# Patient Record
Sex: FEMALE | Race: WHITE | NOT HISPANIC OR LATINO | Employment: FULL TIME | URBAN - METROPOLITAN AREA
[De-identification: names, ages, dates, MRNs, and addresses within clinical notes are randomized per-mention and may not be internally consistent; named-entity substitution may affect disease eponyms.]

---

## 2017-01-24 DIAGNOSIS — M81.0 OSTEOPOROSIS: Primary | ICD-10-CM

## 2017-01-26 RX ORDER — ALENDRONATE SODIUM 70 MG/1
TABLET ORAL
Qty: 4 TABLET | Refills: 0 | Status: SHIPPED | OUTPATIENT
Start: 2017-01-26 | End: 2017-04-06

## 2017-01-26 NOTE — TELEPHONE ENCOUNTER
aldendronate 70mg      Last Written Prescription Date:  3/31/16  Last Fill Quantity: 12,   # refills: 3  Last Office Visit with [facility] primary care provider:  3/31/16  Future Office visit: none    Pt due for annual, no appt scheduled. One month extension sent.

## 2017-01-30 ENCOUNTER — OFFICE VISIT (OUTPATIENT)
Dept: FAMILY MEDICINE | Facility: CLINIC | Age: 62
End: 2017-01-30
Payer: COMMERCIAL

## 2017-01-30 DIAGNOSIS — L81.4 SOLAR LENTIGO: ICD-10-CM

## 2017-01-30 DIAGNOSIS — D22.5 MELANOCYTIC NEVI OF TRUNK: ICD-10-CM

## 2017-01-30 DIAGNOSIS — L57.0 AK (ACTINIC KERATOSIS): Primary | ICD-10-CM

## 2017-01-30 PROCEDURE — 99213 OFFICE O/P EST LOW 20 MIN: CPT | Mod: 25 | Performed by: FAMILY MEDICINE

## 2017-01-30 NOTE — MR AVS SNAPSHOT
After Visit Summary   1/30/2017    Jeniffer Meyer    MRN: [id number]           Patient Information     Date Of Birth          1955        Visit Information        Provider Department      1/30/2017 12:20 PM Jinny Ibrahim MD [facility] - Primary Care Skin        Today's Diagnoses     AK (actinic keratosis)    -  1     Solar lentigo           Care Instructions    Yearly skin exam    CRYOTHERAPY FOR ACTINIC KERATOSES POST-TREATMENT CARE INSTRUCTIONS  Actinic keratoses are benign, scaly or gritty lesions that appear in sun-exposed areas and may progress to skin cancers. For this reason, it is important to treat them before they become cancerous.  Liquid nitrogen is mildly uncomfortable when applied to the skin, but the discomfort rapidly subsides.    Post-Treatment:  You may experience burning and/or stinging immediately following the procedure. The discomfort from the procedure may persist over the next 12-24 hours. The area treated will look pinker and slightly swollen before the healing process begins. You may also notice redness, swelling, tenderness, weeping and crusts or scabs.    Blister - You may or may notice blistering from the freezing. If you develop an uncomfortable blister from today's treatment, you may gently puncture this with a needle that has been cleaned with alcohol. However, do not remove the protective skin layer of the blister.    Scab - After a few days, you may notice scaliness or scab formation. Do not pick at the scabs because this may cause slower healing and a permanent scar.    The skin may appear temporarily darker at the treatment site, but this usually fades over a period of months, provided that the area is protected from the sun.    Care of the areas treated:    Wash the area with a mild cleanser.    Gently pat dry.    Do not rub.     Keep protected from the sun during the healing process and for a full year following treatment as the skin continues  "to remodel during this time.    Apply Vaseline or Aquaphor ointment sparingly to the site for the first 7 days after treatment.    Do not use Neosporin, as many people eventually develop a medication allergy, that can easily be confused with an infection, to Neomycin.    Return if:  There should not be any residual scaling. If there is any concern that the lesion has persisted after 4-6 weeks, make an appointment for a re-check. Healing time does vary depending on your individual healing process and the area of the body treated. Most patients will be healed in one month.    Signs of Infection:  Thankfully this is rare. However if you notice persistent colored drainage, increasing pain, fever or other signs of infection, please call us at: [phone]          Follow-ups after your visit        Who to contact     If you have questions or need follow up information about today's clinic visit or your schedule please contact [facility] - PRIMARY CARE SKIN directly at [phone].  Normal or non-critical lab and imaging results will be communicated to you by [employer], letter or phone within 4 business days after the clinic has received the results. If you do not hear from us within 7 days, please contact the clinic through [employer] or phone. If you have a critical or abnormal lab result, we will notify you by phone as soon as possible.  Submit refill requests through [employer] or call your pharmacy and they will forward the refill request to us. Please allow 3 business days for your refill to be completed.          Additional Information About Your Visit        [employer] Information     [employer] lets you send messages to your doctor, view your test results, renew your prescriptions, schedule appointments and more. To sign up, go to [email or web address] . Click on \"Log in\" on the left side of the screen, which will take you to the Welcome page. Then click on \"Sign up Now\" on the right side of the page.     You will " be asked to enter the access code listed below, as well as some personal information. Please follow the directions to create your username and password.     Your access code is: [email or web address]  Expires: 2017 12:51 PM     Your access code will  in 90 days. If you need help or a new code, please call your [facility] clinic or [phone].        Care EveryWhere ID     This is your Care EveryWhere ID. This could be used by other organizations to access your [facility] medical records  [id number]         Blood Pressure from Last 3 Encounters:   16 110/72   03/27/15 128/68    Weight from Last 3 Encounters:   16 135 lb (61.236 kg)   03/27/15 137 lb (62.143 kg)              We Performed the Following     DESTRUCT PREMALIGNANT LESION, FIRST        Primary Care Provider Office Phone # Fax #    Jessica Jaffe [phone]       ALICE WINKLER [address] [facility] [address] [facility] [address]  [facility] [address]        Thank you!     Thank you for choosing [facility] - PRIMARY CARE SKIN  for your care. Our goal is always to provide you with excellent care. Hearing back from our patients is one way we can continue to improve our services. Please take a few minutes to complete the written survey that you may receive in the mail after your visit with us. Thank you!             Your Updated Medication List - Protect others around you: Learn how to safely use, store and throw away your medicines at www.disposemymeds.org.          This list is accurate as of: 17 12:51 PM.  Always use your most recent med list.                   Brand Name Dispense Instructions for use    * alendronate 70 MG/75ML solution    FOSAMAX     Take 70 mg by mouth every 7 days       * alendronate 70 MG tablet    FOSAMAX    4 tablet    Take 1 tablet by mouth  every 7 days at least 1  hour before breakfast with  8 ounces of water and stay  upright for at least 30min       ciclopirox 8 % Soln    PENLAC    1 Bottle    Apply topically to  affected nails q day       desonide 0.05 % cream    DESOWEN    15 g    Apply sparingly to affected area two times per day for no more then 3 -5 consecutive days       MAGNESIUM OXIDE PO          MULTIVITAMIN PO          PROBIOTIC PO          triamterene-hydrochlorothiazide 18.75-12.5 MG per half-tab    MAXZIDE-25     Take  by mouth daily.       VITAMIN C PO          * Notice:  This list has 2 medication(s) that are the same as other medications prescribed for you. Read the directions carefully, and ask your doctor or other care provider to review them with you.

## 2017-01-30 NOTE — PROGRESS NOTES
[facility] - PRIMARY CARE SKIN    CC : skin exam (full-body)  SUBJECTIVE:                                                    Jeniffer Meyer is a 60 year old female who presents to clinic today for a full-body skin exam because of family history of non-melanoma skin cancer. A lesion was noted on the back of the neck when she had a massage.      Uses sunscreen : Yes: and sun-protective clothing, frequency : daily.  Previous history of sun exposure : Yes: with a history of blistering sunburns when younger and previous use of tanning bed. Just uses self jeri.    Personal history of skin cancer : No, eczema : Yes and psoriasis : No.  Family history of skin cancer : Yes: father and brother - both non-melanoma, eczema : No and psoriasis : No.    Occupation : [employer]     Patient Active Problem List   Diagnosis     Osteoporosis     Hypertension, essential       Past Medical History   Diagnosis Date     Anxiety      Arthritis      Hypertension, essential      followed by PCP (Dr. Jaffe)     Migraine      Osteoporosis 1/2014     hip T-score -2.8 (left) and -2.7 (right), on Fosomax    Past Surgical History   Procedure Laterality Date     Mammoplasty augmentation Bilateral      saline implants     Endometrial sampling (biopsy)  4/2006     PMB-->path: benign     Foot surgery       Hc ablation, endometrial, thermal, w/o hysteroscopic guidance  6/2004     (Sondra)     Hand surgery       thumb      Social History   Substance Use Topics     Smoking status: Never Smoker      Smokeless tobacco: Never Used     Alcohol Use: 0.0 oz/week     0 Standard drinks or equivalent per week    Family History     Problem (# of Occurrences) Relation (Name,Age of Onset)    Breast Cancer (2) Sister (52), Paternal Grandmother    Colon Cancer (1) Father    DIABETES (1) Mother    Fractures (1) Mother: Hip    HEART DISEASE (1) Brother    Hyperlipidemia (2) Mother, Father    Hypertension (2) Mother, Father    OSTEOPOROSIS (3) Mother,  Maternal Grandmother, Maternal Grandfather           Prescription Medications as of 1/30/2017             alendronate (FOSAMAX) 70 MG tablet Take 1 tablet by mouth  every 7 days at least 1  hour before breakfast with  8 ounces of water and stay  upright for at least 30min    desonide (DESOWEN) 0.05 % cream Apply sparingly to affected area two times per day for no more then 3 -5 consecutive days    MAGNESIUM OXIDE PO     Multiple Vitamins-Minerals (MULTIVITAMIN PO)     Ascorbic Acid (VITAMIN C PO)     alendronate (FOSAMAX) 70 MG/75ML solution Take 70 mg by mouth every 7 days    ciclopirox (PENLAC) 8 % SOLN Apply topically to affected nails q day    triamterene-hydrochlorothiazide (MAXZIDE-25) 18.75-12.5 MG Take  by mouth daily.    Probiotic Product (PROBIOTIC PO)           No Known Allergies     INTEGUMENTARY/SKIN: NEGATIVE for worrisome rashes, moles or lesions  ROS : 14 point review of systems was negative except the symptoms listed above in the HPI.    This document serves as a record of the services and decisions personally performed and made by Karen Ibrahim MD. It was created on her behalf by Rd Jones, a trained medical scribe.  The creation of this document is based on the scribe's personal observations and the the provider's statements to the medical scribe.  Rd Jones, December 18, 2015 12:02 PM      OBJECTIVE:                                                    GENERAL: healthy, alert and no distress  SKIN: [place] Skin Type - II.  This patient was examined from the top of the head to the bottom of the feet  including scalp, face, neck, back, chest, breasts, buttocks, both arms, both legs, both hands, both feet, all 10 fingers in all 10 toes. The dermatoscope was used to help evaluate pigmented lesions.  Skin Pertinent Findings:  Arms : Scattered, brown, macule(s) most consistent with benign solar lentigo. Infrequent, brown macules of various sizes and shapes most consistent with benign nevi  (melanocytic nevi).     Chest, abdomen and back : multiple brown, macule(s) most consistent with benign solar lentigo. Infrequent, brown macules most consistent with benign nevi (melanocytic nevi).    Legs : Multiple brown, macule(s) most consistent with benign solar lentigo. Scattered 2 mm in size, brown macules most consistent with benign nevi (melanocytic nevi).     Diagnostic Test Results:  none           ASSESSMENT:                                                      Encounter Diagnoses   Name Primary?     AK (actinic keratosis) Yes     Solar lentigo            PLAN:                                                    Patient Instructions   SUNSCREEN INSTRUCTIONS    FUTURE APPOINTMENTS  Follow up in 1 year(s) for a full-body skin exam.    The patient was counseled about sunscreens and sun avoidance. The patient was counseled to check the skin regularly and report any lesion that is new, changing, itching, scabbing, bleeding or otherwise bothersome. The patient was discharged ambulatory and in stable condition.    Recommendations : annual skin exams.      PROCEDURES:                                                    None.    TT : 25 minutes.  CT : 15 minutes.      The information in this document, created by the medical scribe for me, accurately reflects the services I personally performed and the decisions made by me. I have reviewed and approved this document for accuracy prior to leaving the patient care area.  Karen Ibrahim MD December 18, 2015 12:02 PM  [facility] - PRIMARY CARE SKIN

## 2017-01-30 NOTE — PATIENT INSTRUCTIONS
Yearly skin exam    CRYOTHERAPY FOR ACTINIC KERATOSES POST-TREATMENT CARE INSTRUCTIONS  Actinic keratoses are benign, scaly or gritty lesions that appear in sun-exposed areas and may progress to skin cancers. For this reason, it is important to treat them before they become cancerous.  Liquid nitrogen is mildly uncomfortable when applied to the skin, but the discomfort rapidly subsides.    Post-Treatment:  You may experience burning and/or stinging immediately following the procedure. The discomfort from the procedure may persist over the next 12-24 hours. The area treated will look pinker and slightly swollen before the healing process begins. You may also notice redness, swelling, tenderness, weeping and crusts or scabs.    Blister - You may or may notice blistering from the freezing. If you develop an uncomfortable blister from today's treatment, you may gently puncture this with a needle that has been cleaned with alcohol. However, do not remove the protective skin layer of the blister.    Scab - After a few days, you may notice scaliness or scab formation. Do not pick at the scabs because this may cause slower healing and a permanent scar.    The skin may appear temporarily darker at the treatment site, but this usually fades over a period of months, provided that the area is protected from the sun.    Care of the areas treated:    Wash the area with a mild cleanser.    Gently pat dry.    Do not rub.     Keep protected from the sun during the healing process and for a full year following treatment as the skin continues to remodel during this time.    Apply Vaseline or Aquaphor ointment sparingly to the site for the first 7 days after treatment.    Do not use Neosporin, as many people eventually develop a medication allergy, that can easily be confused with an infection, to Neomycin.    Return if:  There should not be any residual scaling. If there is any concern that the lesion has persisted after 4-6 weeks,  make an appointment for a re-check. Healing time does vary depending on your individual healing process and the area of the body treated. Most patients will be healed in one month.    Signs of Infection:  Thankfully this is rare. However if you notice persistent colored drainage, increasing pain, fever or other signs of infection, please call us at: [phone]

## 2017-02-20 ENCOUNTER — OFFICE VISIT (OUTPATIENT)
Dept: FAMILY MEDICINE | Facility: CLINIC | Age: 62
End: 2017-02-20
Payer: COMMERCIAL

## 2017-02-20 DIAGNOSIS — L57.0 AK (ACTINIC KERATOSIS): Primary | ICD-10-CM

## 2017-02-20 DIAGNOSIS — Z87.2 HISTORY OF ACTINIC KERATOSES: ICD-10-CM

## 2017-02-20 DIAGNOSIS — Z80.8 FAMILY HISTORY OF NONMELANOMA SKIN CANCER: ICD-10-CM

## 2017-02-20 DIAGNOSIS — B35.1 DERMATOPHYTOSIS OF NAIL: ICD-10-CM

## 2017-02-20 PROCEDURE — 17000 DESTRUCT PREMALG LESION: CPT | Performed by: FAMILY MEDICINE

## 2017-02-20 PROCEDURE — 17003 DESTRUCT PREMALG LES 2-14: CPT | Performed by: FAMILY MEDICINE

## 2017-02-20 PROCEDURE — 99212 OFFICE O/P EST SF 10 MIN: CPT | Mod: 25 | Performed by: FAMILY MEDICINE

## 2017-02-20 RX ORDER — CICLOPIROX 80 MG/ML
SOLUTION TOPICAL
Qty: 1 BOTTLE | Refills: 3 | Status: CANCELLED | OUTPATIENT
Start: 2017-02-20

## 2017-02-20 NOTE — PROGRESS NOTES
[facility] - PRIMARY CARE SKIN    CC : actinic keratosis  SUBJECTIVE:                                                    Jeniffer Meyer is a 61 year old female who presents to clinic today for cryotherapy treatment of an actinic keratoses the lip after last full-body skin cancer screening on 1/30/2017.    Issue Two : She also requests refill of Penlac for dermatophytosis of nails. This is worse in the summer, aggravated by use of nail polish (summer use only). Recurrence on three particular toes.    Uses sunscreen : Yes: and sun-protective clothing, frequency : daily.  Previous history of sun exposure : Yes: with a history of blistering sunburns when younger and previous use of tanning bed. Just uses self jeri.    Personal history of skin cancer : No, eczema : Yes and psoriasis : No.  Family history of skin cancer : Yes: father and brother - both non-melanoma, eczema : No and psoriasis : No.    Occupation : [employer]     Patient Active Problem List   Diagnosis     Osteoporosis     Hypertension, essential     Family history of nonmelanoma skin cancer     History of actinic keratoses       Past Medical History   Diagnosis Date     Anxiety      Arthritis      Hypertension, essential      followed by PCP (Dr. Jaffe)     Migraine      Osteoporosis 1/2014     hip T-score -2.8 (left) and -2.7 (right), on Fosomax    Past Surgical History   Procedure Laterality Date     Mammoplasty augmentation Bilateral      saline implants     Endometrial sampling (biopsy)  4/2006     PMB-->path: benign     Foot surgery       Hc ablation, endometrial, thermal, w/o hysteroscopic guidance  6/2004     (Sondra)     Hand surgery       thumb      Social History   Substance Use Topics     Smoking status: Never Smoker     Smokeless tobacco: Never Used     Alcohol use 0.0 oz/week     0 Standard drinks or equivalent per week    Family History     Problem (# of Occurrences) Relation (Name,Age of Onset)    Breast Cancer (2) Sister (52),  Paternal Grandmother    Colon Cancer (1) Father    DIABETES (1) Mother    Fractures (1) Mother: Hip    HEART DISEASE (1) Brother    Hyperlipidemia (2) Father, Mother    Hypertension (2) Father, Mother    OSTEOPOROSIS (3) Mother, Maternal Grandmother, Maternal Grandfather           Prescription Medications as of 2/20/2017             alendronate (FOSAMAX) 70 MG tablet Take 1 tablet by mouth  every 7 days at least 1  hour before breakfast with  8 ounces of water and stay  upright for at least 30min    desonide (DESOWEN) 0.05 % cream Apply sparingly to affected area two times per day for no more then 3 -5 consecutive days    MAGNESIUM OXIDE PO     Multiple Vitamins-Minerals (MULTIVITAMIN PO)     Probiotic Product (PROBIOTIC PO)     Ascorbic Acid (VITAMIN C PO)     alendronate (FOSAMAX) 70 MG/75ML solution Take 70 mg by mouth every 7 days    ciclopirox (PENLAC) 8 % SOLN Apply topically to affected nails q day    triamterene-hydrochlorothiazide (MAXZIDE-25) 18.75-12.5 MG Take  by mouth daily.          No Known Allergies     INTEGUMENTARY/SKIN: POSITIVE for changing lesion  ROS : 14 point review of systems was negative except the symptoms listed above in the HPI.    This document serves as a record of the services and decisions personally performed and made by Karen Ibrahim MD. It was created on her behalf by Rd Jones, a trained medical scribe.  The creation of this document is based on the scribe's personal observations and the provider's statements to the medical scribe.  Rd Jones, February 20, 2017 11:44 AM      OBJECTIVE:                                                    GENERAL: healthy, alert and no distress  SKIN: [place] Skin Type - II.  Face, Mouth, Feet and Toes were examined. The dermatoscope was used to help evaluate pigmented lesions.  Skin Pertinent Findings:  Mid-upper lip at vermilion border : 2 mm - 3 mm in size, erythematous, scaly, non-indurated lesion(s) most consistent with actinic  keratoses.  Name : Liquid Nitrogen Cry-Ac Cryotherapy.  Indication : Pre-malignant lesion.  Location(s) : upper lip - x2.  Completed by : Karen Ibrahim MD  Note : Discussed natural history of lesion and treatment options. Prior to treatment, we discussed inflammation, tenderness post-procedure, the healing process, and the risks of pain, infection, scarring, blistering, and hypo-/hyperpigmentation after healing. Explained that these lesions may grow back and may need additional treatment or re-treatment. The patient expressed a desire to proceed with cryotherapy.    The lesion(s) was treated with liquid nitrogen Cry-Ac, five second freeze repeated twice with a pause to allow for the area to thaw. If this lesion should recur, then it needs to be re-evaluated.    Patient tolerated the procedure well and left in good condition.  Total number of lesions treated : 2.    Diagnostic Test Results:  none           ASSESSMENT:                                                      Encounter Diagnoses   Name Primary?     AK (actinic keratosis) Yes     Dermatophytosis of nail      Family history of nonmelanoma skin cancer      History of actinic keratoses            PLAN:                                                    Patient Instructions   FUTURE APPOINTMENTS  Follow up in Jan 2018 for a full-body skin cancer screening. Your last skin exam was on Jan. 30, 2017.    TOPICAL MEDICATION  Apply Vicks Vaporub nightly at bedtime to the affected toenails.    CRYOTHERAPY FOR ACTINIC KERATOSES POST-TREATMENT CARE INSTRUCTIONS  Actinic keratoses are benign, scaly or gritty lesions that appear in sun-exposed areas and may progress to skin cancers. For this reason, it is important to treat them before they become cancerous.  Liquid nitrogen is mildly uncomfortable when applied to the skin, but the discomfort rapidly subsides.    Post-Treatment:  You may experience burning and/or stinging immediately following the procedure. The  discomfort from the procedure may persist over the next 12-24 hours. The area treated will look pinker and slightly swollen before the healing process begins. You may also notice redness, swelling, tenderness, weeping and crusts or scabs.    Blister - You may or may notice blistering from the freezing. If you develop an uncomfortable blister from today's treatment, you may gently puncture this with a needle that has been cleaned with alcohol. However, do not remove the protective skin layer of the blister.    Scab - After a few days, you may notice scaliness or scab formation. Do not pick at the scabs because this may cause slower healing and a permanent scar.    The skin may appear temporarily darker at the treatment site, but this usually fades over a period of months, provided that the area is protected from the sun.    Care of the areas treated:    Wash the area with a mild cleanser.    Gently pat dry.    Do not rub.     Keep protected from the sun during the healing process and for a full year following treatment as the skin continues to remodel during this time.    Apply Vaseline or Aquaphor ointment sparingly to the site for the first 7 days after treatment.    Do not use Neosporin, as many people eventually develop a medication allergy, that can easily be confused with an infection, to Neomycin.    Return if:  There should not be any residual scaling. If there is any concern that the lesion has persisted after 4-6 weeks, make an appointment for a re-check. Healing time does vary depending on your individual healing process and the area of the body treated. Most patients will be healed in one month.    Signs of Infection:  Thankfully this is rare. However if you notice persistent colored drainage, increasing pain, fever or other signs of infection, please call us at: [phone]    TIPS FOR AVOIDING SKIN CANCER AND PREMATURE SKIN AGING  DOs    Wear a wide-brimmed hat and sunglasses.     Wear sun-protective  "clothing.    [employer] and other [employer] make sun protective clothing that is stylish, comfortable and cool.    [employer] and other companies make UV arm sleeves suitable for golfing, gardening and other activities.      Wear sunscreen on your face every day, even in the winter. (UVA \"aging rays\" penetrates window glass and is just as strong in the winter as in the summer) Sunscreen with SPF > 30 is recommended.    Wear sunscreen on your body and re-apply every 2 hours when exposed to sun. Sunscreen with SPF > 50 is recommended.    You should use about 3 tablespoons of sunscreen to protect your whole body. Thus a typical eight ounce bottle of sunscreen should last 4 applications. Remember, that the SPF rating is compromised if you don t apply enough. Most people only apply 1/2 - 1/3 of the amount they need. Also don t forget areas such as your ears, feet, upper back and harder to reach places. Keep in mind that these amounts should be increased for larger body sizes.    Note that spray sunscreens are only for touch-up application, not as a base layer. Also, use spray sunscreens with caution around small children due to inhalation risk.    Product Recommendations:    Look for broad spectrum sunscreen (blocks both UVA and UVB).    Look for titanium dioxide and/or zinc oxide in the active ingredients, which are physical blockers as opposed to chemical blockers. Chemical-free sunscreens should not irritate the skin.    Good examples include: Blue Lizard, EltaMD, Vanicream, Solbar, CeraVe.     For sensitive skin, consider : SkinMedica Essential Defense Mineral Shield Broad Spectrum SPF 35      Avoid combination products that include both sunscreen and insect repellant, as sunscreen should be applied every 2 hours, but insect repellant should not be applied as frequently.    Avoid products that include oxybenzone or retinyl palmitate.    For more " "information:  http://www.skincancer.org/prevention/sun-protection/sunscreen/sunscreens-safe-and-effective    DON'Ts    All tanning damages the skin. Aim for ivory skin year round and you will have less trouble with your skin in years to come. There is no merit in getting \"a base tan\" before a warm weather vacation, as any tanning indicates your body's response to sun damage.    Never use tanning beds. Tanning beds are associated with much higher risks of skin cancer.    Avoid mid-day sunshine (10 AM to 3 PM), if possible.    Stop smoking. Smokers have higher rates of skin cancer and also have premature skin wrinkling.        PROCEDURES:                                                    None.    TT : 20 minutes.  CT : 15 minutes.      The information in this document, created by the medical scribe for me, accurately reflects the services I personally performed and the decisions made by me. I have reviewed and approved this document for accuracy prior to leaving the patient care area.  Karen Ibrahim MD February 20, 2017 11:49 AM  [facility] - PRIMARY CARE SKIN  "

## 2017-02-20 NOTE — PATIENT INSTRUCTIONS
FUTURE APPOINTMENTS  Follow up in Jan 2018 for a full-body skin cancer screening. Your last skin exam was on Jan. 30, 2017.    TOPICAL MEDICATION  Apply Vicks Vaporub nightly at bedtime to the affected toenails.    CRYOTHERAPY FOR ACTINIC KERATOSES POST-TREATMENT CARE INSTRUCTIONS  Actinic keratoses are benign, scaly or gritty lesions that appear in sun-exposed areas and may progress to skin cancers. For this reason, it is important to treat them before they become cancerous.  Liquid nitrogen is mildly uncomfortable when applied to the skin, but the discomfort rapidly subsides.    Post-Treatment:  You may experience burning and/or stinging immediately following the procedure. The discomfort from the procedure may persist over the next 12-24 hours. The area treated will look pinker and slightly swollen before the healing process begins. You may also notice redness, swelling, tenderness, weeping and crusts or scabs.    Blister - You may or may notice blistering from the freezing. If you develop an uncomfortable blister from today's treatment, you may gently puncture this with a needle that has been cleaned with alcohol. However, do not remove the protective skin layer of the blister.    Scab - After a few days, you may notice scaliness or scab formation. Do not pick at the scabs because this may cause slower healing and a permanent scar.    The skin may appear temporarily darker at the treatment site, but this usually fades over a period of months, provided that the area is protected from the sun.    Care of the areas treated:    Wash the area with a mild cleanser.    Gently pat dry.    Do not rub.     Keep protected from the sun during the healing process and for a full year following treatment as the skin continues to remodel during this time.    Apply Vaseline or Aquaphor ointment sparingly to the site for the first 7 days after treatment.    Do not use Neosporin, as many people eventually develop a medication  "allergy, that can easily be confused with an infection, to Neomycin.    Return if:  There should not be any residual scaling. If there is any concern that the lesion has persisted after 4-6 weeks, make an appointment for a re-check. Healing time does vary depending on your individual healing process and the area of the body treated. Most patients will be healed in one month.    Signs of Infection:  Thankfully this is rare. However if you notice persistent colored drainage, increasing pain, fever or other signs of infection, please call us at: [phone]    TIPS FOR AVOIDING SKIN CANCER AND PREMATURE SKIN AGING  DOs    Wear a wide-brimmed hat and sunglasses.     Wear sun-protective clothing.    [employer] and other [employer] make sun protective clothing that is stylish, comfortable and cool.    [employer] and other [employer] make UV arm sleeves suitable for golfing, gardening and other activities.      Wear sunscreen on your face every day, even in the winter. (UVA \"aging rays\" penetrates window glass and is just as strong in the winter as in the summer) Sunscreen with SPF > 30 is recommended.    Wear sunscreen on your body and re-apply every 2 hours when exposed to sun. Sunscreen with SPF > 50 is recommended.    You should use about 3 tablespoons of sunscreen to protect your whole body. Thus a typical eight ounce bottle of sunscreen should last 4 applications. Remember, that the SPF rating is compromised if you don t apply enough. Most people only apply 1/2 - 1/3 of the amount they need. Also don t forget areas such as your ears, feet, upper back and harder to reach places. Keep in mind that these amounts should be increased for larger body sizes.    Note that spray sunscreens are only for touch-up application, not as a base layer. Also, use spray sunscreens with caution around small children due to inhalation risk.    Product Recommendations:    Look for broad spectrum sunscreen (blocks both UVA and " "UVB).    Look for titanium dioxide and/or zinc oxide in the active ingredients, which are physical blockers as opposed to chemical blockers. Chemical-free sunscreens should not irritate the skin.    Good examples include: Blue Lizard, EltaMD, Vanicream, Solbar, CeraVe.     For sensitive skin, consider : SkinMedica Essential Defense Mineral Shield Broad Spectrum SPF 35      Avoid combination products that include both sunscreen and insect repellant, as sunscreen should be applied every 2 hours, but insect repellant should not be applied as frequently.    Avoid products that include oxybenzone or retinyl palmitate.    For more information:  http://www.skincancer.org/prevention/sun-protection/sunscreen/sunscreens-safe-and-effective    DON'Ts    All tanning damages the skin. Aim for ivory skin year round and you will have less trouble with your skin in years to come. There is no merit in getting \"a base tan\" before a warm weather vacation, as any tanning indicates your body's response to sun damage.    Never use tanning beds. Tanning beds are associated with much higher risks of skin cancer.    Avoid mid-day sunshine (10 AM to 3 PM), if possible.    Stop smoking. Smokers have higher rates of skin cancer and also have premature skin wrinkling.  "

## 2017-02-20 NOTE — MR AVS SNAPSHOT
After Visit Summary   2/20/2017    Jeniffer Meyer    MRN: [id number]           Patient Information     Date Of Birth          1955        Visit Information        Provider Department      2/20/2017 11:40 AM Jinny Ibrahim MD [facility] - Primary Care Skin        Today's Diagnoses     AK (actinic keratosis)    -  1    Dermatophytosis of nail        Family history of nonmelanoma skin cancer        History of actinic keratoses          Care Instructions    FUTURE APPOINTMENTS  Follow up in Jan 2018 for a full-body skin cancer screening. Your last skin exam was on Jan. 30, 2017.    TOPICAL MEDICATION  Apply Vicks Vaporub nightly at bedtime to the affected toenails.    CRYOTHERAPY FOR ACTINIC KERATOSES POST-TREATMENT CARE INSTRUCTIONS  Actinic keratoses are benign, scaly or gritty lesions that appear in sun-exposed areas and may progress to skin cancers. For this reason, it is important to treat them before they become cancerous.  Liquid nitrogen is mildly uncomfortable when applied to the skin, but the discomfort rapidly subsides.    Post-Treatment:  You may experience burning and/or stinging immediately following the procedure. The discomfort from the procedure may persist over the next 12-24 hours. The area treated will look pinker and slightly swollen before the healing process begins. You may also notice redness, swelling, tenderness, weeping and crusts or scabs.    Blister - You may or may notice blistering from the freezing. If you develop an uncomfortable blister from today's treatment, you may gently puncture this with a needle that has been cleaned with alcohol. However, do not remove the protective skin layer of the blister.    Scab - After a few days, you may notice scaliness or scab formation. Do not pick at the scabs because this may cause slower healing and a permanent scar.    The skin may appear temporarily darker at the treatment site, but this usually fades over a  "period of months, provided that the area is protected from the sun.    Care of the areas treated:    Wash the area with a mild cleanser.    Gently pat dry.    Do not rub.     Keep protected from the sun during the healing process and for a full year following treatment as the skin continues to remodel during this time.    Apply Vaseline or Aquaphor ointment sparingly to the site for the first 7 days after treatment.    Do not use Neosporin, as many people eventually develop a medication allergy, that can easily be confused with an infection, to Neomycin.    Return if:  There should not be any residual scaling. If there is any concern that the lesion has persisted after 4-6 weeks, make an appointment for a re-check. Healing time does vary depending on your individual healing process and the area of the body treated. Most patients will be healed in one month.    Signs of Infection:  Thankfully this is rare. However if you notice persistent colored drainage, increasing pain, fever or other signs of infection, please call us at: [phone]    TIPS FOR AVOIDING SKIN CANCER AND PREMATURE SKIN AGING  DOs    Wear a wide-brimmed hat and sunglasses.     Wear sun-protective clothing.    [employer] and other [employer] make sun protective clothing that is stylish, comfortable and cool.    [employer] and other [employer] make UV arm sleeves suitable for golfing, gardening and other activities.      Wear sunscreen on your face every day, even in the winter. (UVA \"aging rays\" penetrates window glass and is just as strong in the winter as in the summer) Sunscreen with SPF > 30 is recommended.    Wear sunscreen on your body and re-apply every 2 hours when exposed to sun. Sunscreen with SPF > 50 is recommended.    You should use about 3 tablespoons of sunscreen to protect your whole body. Thus a typical eight ounce bottle of sunscreen should last 4 applications. Remember, that the SPF rating is compromised if you don t " "apply enough. Most people only apply 1/2 - 1/3 of the amount they need. Also don t forget areas such as your ears, feet, upper back and harder to reach places. Keep in mind that these amounts should be increased for larger body sizes.    Note that spray sunscreens are only for touch-up application, not as a base layer. Also, use spray sunscreens with caution around small children due to inhalation risk.    Product Recommendations:    Look for broad spectrum sunscreen (blocks both UVA and UVB).    Look for titanium dioxide and/or zinc oxide in the active ingredients, which are physical blockers as opposed to chemical blockers. Chemical-free sunscreens should not irritate the skin.    Good examples include: Blue Lizard, EltaMD, Vanicream, Solbar, CeraVe.     For sensitive skin, consider : SkinMedica Essential Defense Mineral Shield Broad Spectrum SPF 35      Avoid combination products that include both sunscreen and insect repellant, as sunscreen should be applied every 2 hours, but insect repellant should not be applied as frequently.    Avoid products that include oxybenzone or retinyl palmitate.    For more information:  http://www.skincancer.org/prevention/sun-protection/sunscreen/sunscreens-safe-and-effective    DON'Ts    All tanning damages the skin. Aim for ivory skin year round and you will have less trouble with your skin in years to come. There is no merit in getting \"a base tan\" before a warm weather vacation, as any tanning indicates your body's response to sun damage.    Never use tanning beds. Tanning beds are associated with much higher risks of skin cancer.    Avoid mid-day sunshine (10 AM to 3 PM), if possible.    Stop smoking. Smokers have higher rates of skin cancer and also have premature skin wrinkling.        Follow-ups after your visit        Follow-up notes from your care team     Return if symptoms worsen or fail to improve.      Your next 10 appointments already scheduled     Apr 06, 2017 " "10:30 AM CDT   PHYSICAL with Terra Fraser MD   [facility] [place] ([facility] [place])    [address]  [address]  [place] [address] [phone]            Apr 06, 2017 11:00 AM CDT   MA SCREEN W IMPLANTS DIGITAL RIGHT with WEMA1   [facility] [place] ([facility] [place])    [address]  [place] [address]   [phone]           Do not use any powder, lotion or deodorant under your arms or on your breast. If you do, we will ask you to remove it before your exam.  Wear comfortable, two-piece clothing.  If you have any allergies, tell your care team.  Bring any previous mammograms from other facilities or have them mailed to the breast center.              Who to contact     If you have questions or need follow up information about today's clinic visit or your schedule please contact [facility] - PRIMARY CARE SKIN directly at [phone].  Normal or non-critical lab and imaging results will be communicated to you by [employer]hart, letter or phone within 4 business days after the clinic has received the results. If you do not hear from us within 7 days, please contact the clinic through [employer] or phone. If you have a critical or abnormal lab result, we will notify you by phone as soon as possible.  Submit refill requests through BlueArc or call your pharmacy and they will forward the refill request to us. Please allow 3 business days for your refill to be completed.          Additional Information About Your Visit        BlueArc Information     BlueArc lets you send messages to your doctor, view your test results, renew your prescriptions, schedule appointments and more. To sign up, go to [email or web address] . Click on \"Log in\" on the left side of the screen, which will take you to the Welcome page. Then click on \"Sign up Now\" on the right side of the page.     You will be asked to enter the access code listed " below, as well as some personal information. Please follow the directions to create your username and password.     Your access code is: [email or web address]  Expires: 2017 12:51 PM     Your access code will  in 90 days. If you need help or a new code, please call your [place] clinic or [phone].        Care EveryWhere ID     This is your Care EveryWhere ID. This could be used by other organizations to access your [place] medical records  [id number]         Blood Pressure from Last 3 Encounters:   16 110/72   03/27/15 128/68    Weight from Last 3 Encounters:   16 135 lb (61.2 kg)   03/27/15 137 lb (62.1 kg)              Today, you had the following     No orders found for display       Primary Care Provider Office Phone # Fax #    Jessica Jaffe [phone]       ALICE WINKLER [address]  [facility] [place] [facility] [address]        Thank you!     Thank you for choosing [facility] - PRIMARY CARE SKIN  for your care. Our goal is always to provide you with excellent care. Hearing back from our patients is one way we can continue to improve our services. Please take a few minutes to complete the written survey that you may receive in the mail after your visit with us. Thank you!             Your Updated Medication List - Protect others around you: Learn how to safely use, store and throw away your medicines at www.disposemymeds.org.          This list is accurate as of: 17 11:51 AM.  Always use your most recent med list.                   Brand Name Dispense Instructions for use    * alendronate 70 MG/75ML solution    FOSAMAX     Take 70 mg by mouth every 7 days       * alendronate 70 MG tablet    FOSAMAX    4 tablet    Take 1 tablet by mouth  every 7 days at least 1  hour before breakfast with  8 ounces of water and stay  upright for at least 30min       ciclopirox 8 % Soln    PENLAC    1 Bottle    Apply topically to affected nails q day       desonide 0.05 % cream     DESOWEN    15 g    Apply sparingly to affected area two times per day for no more then 3 -5 consecutive days       MAGNESIUM OXIDE PO          MULTIVITAMIN PO          PROBIOTIC PO          triamterene-hydrochlorothiazide 18.75-12.5 MG per half-tab    MAXZIDE-25     Take  by mouth daily.       VITAMIN C PO          * Notice:  This list has 2 medication(s) that are the same as other medications prescribed for you. Read the directions carefully, and ask your doctor or other care provider to review them with you.

## 2017-04-06 ENCOUNTER — RADIANT APPOINTMENT (OUTPATIENT)
Dept: MAMMOGRAPHY | Facility: CLINIC | Age: 62
End: 2017-04-06
Payer: COMMERCIAL

## 2017-04-06 ENCOUNTER — OFFICE VISIT (OUTPATIENT)
Dept: OBGYN | Facility: CLINIC | Age: 62
End: 2017-04-06
Payer: COMMERCIAL

## 2017-04-06 VITALS
WEIGHT: 138 LBS | HEART RATE: 72 BPM | DIASTOLIC BLOOD PRESSURE: 90 MMHG | HEIGHT: 67 IN | SYSTOLIC BLOOD PRESSURE: 146 MMHG | BODY MASS INDEX: 21.66 KG/M2

## 2017-04-06 DIAGNOSIS — Z12.31 VISIT FOR SCREENING MAMMOGRAM: ICD-10-CM

## 2017-04-06 DIAGNOSIS — Z01.419 ENCOUNTER FOR GYNECOLOGICAL EXAMINATION WITHOUT ABNORMAL FINDING: Primary | ICD-10-CM

## 2017-04-06 DIAGNOSIS — Z12.11 SCREEN FOR COLON CANCER: ICD-10-CM

## 2017-04-06 DIAGNOSIS — M81.0 OSTEOPOROSIS: ICD-10-CM

## 2017-04-06 PROCEDURE — G0202 SCR MAMMO BI INCL CAD: HCPCS | Mod: TC

## 2017-04-06 PROCEDURE — 77063 BREAST TOMOSYNTHESIS BI: CPT | Mod: TC

## 2017-04-06 PROCEDURE — 99396 PREV VISIT EST AGE 40-64: CPT | Performed by: OBSTETRICS & GYNECOLOGY

## 2017-04-06 RX ORDER — ALENDRONATE SODIUM 70 MG/1
70 TABLET ORAL
Qty: 12 TABLET | Refills: 3 | Status: SHIPPED | OUTPATIENT
Start: 2017-04-06 | End: 2018-02-13

## 2017-04-06 ASSESSMENT — ANXIETY QUESTIONNAIRES
3. WORRYING TOO MUCH ABOUT DIFFERENT THINGS: NOT AT ALL
GAD7 TOTAL SCORE: 1
7. FEELING AFRAID AS IF SOMETHING AWFUL MIGHT HAPPEN: NOT AT ALL
IF YOU CHECKED OFF ANY PROBLEMS ON THIS QUESTIONNAIRE, HOW DIFFICULT HAVE THESE PROBLEMS MADE IT FOR YOU TO DO YOUR WORK, TAKE CARE OF THINGS AT HOME, OR GET ALONG WITH OTHER PEOPLE: NOT DIFFICULT AT ALL
5. BEING SO RESTLESS THAT IT IS HARD TO SIT STILL: NOT AT ALL
1. FEELING NERVOUS, ANXIOUS, OR ON EDGE: NOT AT ALL
2. NOT BEING ABLE TO STOP OR CONTROL WORRYING: NOT AT ALL
6. BECOMING EASILY ANNOYED OR IRRITABLE: SEVERAL DAYS

## 2017-04-06 ASSESSMENT — PATIENT HEALTH QUESTIONNAIRE - PHQ9: 5. POOR APPETITE OR OVEREATING: NOT AT ALL

## 2017-04-06 NOTE — PATIENT INSTRUCTIONS
Follow up with your primary care provider for your other medical problems.  Continue self breast exam.  Increase physical activity and exercise.  Usual safety and preventative measures counseling done.  Last pap smear (2013) was normal and negative for the DNA of high risk HPV subtypes.  No pap was obtained this year.  This was discussed with the patient and she agrees with the plan.  Discussed Osteoporosis screening as well as calcium and Vitamin D recommendations.  Will continue weekly fosamax this year and repeat bone scan in 2018.  Will have results of colonscopy sent to us in May from [place] [facility]

## 2017-04-06 NOTE — MR AVS SNAPSHOT
"              After Visit Summary   2017    Jeniffer Meyer    MRN: [id number]           Patient Information     Date Of Birth          1955        Visit Information        Provider Department      2017 10:30 AM Terra Fraser MD [facility]        Today's Diagnoses     Encounter for gynecological examination without abnormal finding    -  1    Screen for colon cancer        Osteoporosis           Follow-ups after your visit        Who to contact     If you have questions or need follow up information about today's clinic visit or your schedule please contact [facility] directly at [phone].  Normal or non-critical lab and imaging results will be communicated to you by Spotl[employer]hart, letter or phone within 4 business days after the clinic has received the results. If you do not hear from us within 7 days, please contact the clinic through Spotlimehart or phone. If you have a critical or abnormal lab result, we will notify you by phone as soon as possible.  Submit refill requests through [employer] or call your pharmacy and they will forward the refill request to us. Please allow 3 business days for your refill to be completed.          Additional Information About Your Visit        MyChart Information     [employer] lets you send messages to your doctor, view your test results, renew your prescriptions, schedule appointments and more. To sign up, go to [email or web address] . Click on \"Log in\" on the left side of the screen, which will take you to the Welcome page. Then click on \"Sign up Now\" on the right side of the page.     You will be asked to enter the access code listed below, as well as some personal information. Please follow the directions to create your username and password.     Your access code is: [email or web address]  Expires: 2017  1:51 PM     Your access code will  in 90 days. If you need help or a new code, please call your [place] clinic or " "[phone].        Care EveryWhere ID     This is your Care EveryWhere ID. This could be used by other organizations to access your [place] medical records  [id number]        Your Vitals Were     Pulse Height BMI (Body Mass Index)             72 5' 7\" (1.702 m) 21.61 kg/m2          Blood Pressure from Last 3 Encounters:   04/06/17 146/90   03/31/16 110/72   03/27/15 128/68    Weight from Last 3 Encounters:   04/06/17 138 lb (62.6 kg)   03/31/16 135 lb (61.2 kg)   03/27/15 137 lb (62.1 kg)              Today, you had the following     No orders found for display         Today's Medication Changes          These changes are accurate as of: 4/6/17 11:50 AM.  If you have any questions, ask your nurse or doctor.               These medicines have changed or have updated prescriptions.        Dose/Directions    alendronate 70 MG tablet   Commonly known as:  FOSAMAX   This may have changed:  Another medication with the same name was removed. Continue taking this medication, and follow the directions you see here.   Used for:  Osteoporosis   Changed by:  Terra Fraser MD        Take 1 tablet by mouth  every 7 days at least 1  hour before breakfast with  8 ounces of water and stay  upright for at least 30min   Quantity:  4 tablet   Refills:  0         Stop taking these medicines if you haven't already. Please contact your care team if you have questions.     ciclopirox 8 % Soln   Commonly known as:  PENLAC   Stopped by:  Terra Fraser MD           PROBIOTIC PO   Stopped by:  Terra Fraser MD                    Primary Care Provider Office Phone # Fax #    Jessica Jaffe [phone]       ALICE WINKLER [address]  [place] [address]        Thank you!     Thank you for choosing [facility]  for your care. Our goal is always to provide you with excellent care. Hearing back from our patients is one way we can continue to improve our services. Please " take a few minutes to complete the written survey that you may receive in the mail after your visit with us. Thank you!             Your Updated Medication List - Protect others around you: Learn how to safely use, store and throw away your medicines at www.disposemymeds.org.          This list is accurate as of: 4/6/17 11:50 AM.  Always use your most recent med list.                   Brand Name Dispense Instructions for use    alendronate 70 MG tablet    FOSAMAX    4 tablet    Take 1 tablet by mouth  every 7 days at least 1  hour before breakfast with  8 ounces of water and stay  upright for at least 30min       desonide 0.05 % cream    DESOWEN    15 g    Apply sparingly to affected area two times per day for no more then 3 -5 consecutive days       FIBERCON PO          MAGNESIUM OXIDE PO          MULTIVITAMIN PO          triamterene-hydrochlorothiazide 18.75-12.5 MG per half-tab    MAXZIDE-25     Take  by mouth daily.       VITAMIN C PO

## 2017-04-06 NOTE — PROGRESS NOTES
Jeniffer is a 61 year old  female who presents for annual exam.     Besides routine health maintenance, she has no other health concerns today .    HPI:  Here today for yearly exam --doing well. Postmenopausal.  No vb/spotting.  Rare night sweat but no hot flushes.  Not SA due to 's illness.  Denies dryness or pain.  No bowel issues.  Denies bladder concerns --occasionally more frequency but no leaking or urgency.  Gets up ~1x/night but always able to fall back asleep    ; works as  for [employer] --does 8 turn-around trips per month  -staying active; working out with cardio and weights 4-5x/wk; does yoga as well  +mammo today; +SBE on occ  -hx osteoporosis; last dexa  --on weekly fosamax; will repeat next year  PCP is Jessica Jaffe MD; saw her this AM and had bloodwork; manages meds  -due for colonoscopy (5yrs) this May --will have records forwarded  - has been in remission x 2.5yrs (esophageal ca); still having CT scans with [facility] x3pbeghi      GYNECOLOGIC HISTORY:    No LMP recorded. Patient is postmenopausal.  Her current contraception method is: menopause.  She  reports that she has never smoked. She has never used smokeless tobacco.    Patient is not sexually active.  STD testing offered?  Declined  Last PHQ-9 score on record =   PHQ-9 SCORE 2017   Total Score 1     Last GAD7 score on record =   MENA-7 SCORE 2017   Total Score 1     Alcohol Score = 2    HEALTH MAINTENANCE:  Cholesterol: (No results found for: CHOL -follows up with pcp, had done this morning  Last Mammo: 3/31/16, Result: normal, Next Mammo: today   Pap: 3/8/13 neg, HPV-  Colonoscopy:  12, Result: polyps, Next Colonoscopy: due this year. Patient has appointment scheduled for May 26  Dexa:  16    Health maintenance updated:  yes    HISTORY:  Obstetric History       T1      TAB0   SAB0   E0   M0   L0       # Outcome Date GA Lbr Van/2nd Weight Sex Delivery Anes PTL Lv   1 Term  93   5 lb 2 oz (2.325 kg) F  EPI        Name: Milvia          Patient Active Problem List   Diagnosis     Osteoporosis     Hypertension, essential     Family history of nonmelanoma skin cancer     History of actinic keratoses     Past Surgical History:   Procedure Laterality Date     ENDOMETRIAL SAMPLING (BIOPSY)  2006    PMB-->path: benign     FOOT SURGERY       HAND SURGERY      thumb     HC ABLATION, ENDOMETRIAL, THERMAL, W/O HYSTEROSCOPIC GUIDANCE  2004    ([place])     MAMMOPLASTY AUGMENTATION Bilateral     saline implants      Social History   Substance Use Topics     Smoking status: Never Smoker     Smokeless tobacco: Never Used     Alcohol use 0.0 oz/week     0 Standard drinks or equivalent per week      Problem (# of Occurrences) Relation (Name,Age of Onset)    Breast Cancer (2) Sister (52), Paternal Grandmother    Colon Cancer (1) Father    DIABETES (1) Mother    Fractures (1) Mother: Hip    HEART DISEASE (1) Brother    Hyperlipidemia (2) Father, Mother    Hypertension (2) Father, Mother    OSTEOPOROSIS (3) Mother, Maternal Grandmother, Maternal Grandfather            Current Outpatient Prescriptions   Medication Sig     Calcium Polycarbophil (FIBERCON PO)      alendronate (FOSAMAX) 70 MG tablet Take 1 tablet (70 mg) by mouth every 7 days     desonide (DESOWEN) 0.05 % cream Apply sparingly to affected area two times per day for no more then 3 -5 consecutive days     MAGNESIUM OXIDE PO      Multiple Vitamins-Minerals (MULTIVITAMIN PO)      Ascorbic Acid (VITAMIN C PO)      triamterene-hydrochlorothiazide (MAXZIDE-25) 18.75-12.5 MG Take  by mouth daily.     [DISCONTINUED] alendronate (FOSAMAX) 70 MG tablet Take 1 tablet by mouth  every 7 days at least 1  hour before breakfast with  8 ounces of water and stay  upright for at least 30min     [DISCONTINUED] alendronate (FOSAMAX) 70 MG/75ML solution Take 70 mg by mouth every 7 days Reported on 2017     No current facility-administered  "medications for this visit.      Allergies   Allergen Reactions     Codeine Nausea       Past medical, surgical, social and family histories were reviewed and updated in EPIC.    ROS:   12 point review of systems negative other than symptoms noted below.  Head: Nasal Congestion    EXAM:  /90  Pulse 72  Ht 5' 7\" (1.702 m)  Wt 138 lb (62.6 kg)  BMI 21.61 kg/m2   BMI: Body mass index is 21.61 kg/(m^2).    PHYSICAL EXAM:  Constitutional:  Appearance: Well nourished, well developed, alert, in no acute distress  Neck:  Lymph Nodes:  No lymphadenopathy present    Thyroid:  Gland size normal, nontender, no nodules or masses present  on palpation  Chest:  Respiratory Effort:  Breathing unlabored  Cardiovascular:    Heart: Auscultation:  Regular rate, normal rhythm, no murmurs present  Breasts: Inspection of Breasts:  No lymphadenopathy present    Palpation of Breasts and Axillae:  No masses present on palpation, no  breast tenderness    Axillary Lymph Nodes:  No lymphadenopathy present  Gastrointestinal:   Abdominal Examination:  Abdomen nontender to palpation, tone normal without rigidity or guarding, no masses present, umbilicus without lesions   Liver and Spleen:  No hepatomegaly present, liver nontender to palpation    Hernias:  No hernias present  Lymphatic: Lymph Nodes:  No other lymphadenopathy present  Skin:  General Inspection:  No rashes present, no lesions present, no areas of  discoloration    Genitalia and Groin:  No rashes present, no lesions present, no areas of  discoloration, no masses present  Neurologic/Psychiatric:    Mental Status:  Oriented X3     Pelvic Exam:  External Genitalia:     Normal appearance for age, no discharge present, no tenderness present, no inflammatory lesions present, color normal  Vagina:     Normal vaginal vault without central or paravaginal defects, no discharge present, no inflammatory lesions present, no masses present  Bladder:     Nontender to " palpation  Urethra:   Urethral Body:  Urethra palpation normal, urethra structural support normal   Urethral Meatus:  No erythema or lesions present  Cervix:     Appearance healthy, no lesions present, nontender to palpation, no bleeding present  Uterus:     Nontender to palpation, no masses present, position anteflexed, mobility: normal  Adnexa:     No adnexal tenderness present, no adnexal masses present  Perineum:     Perineum within normal limits, no evidence of trauma, no rashes or skin lesions present  Anus:     Anus within normal limits, no hemorrhoids present  Inguinal Lymph Nodes:     No lymphadenopathy present  Pubic Hair:     Normal pubic hair distribution for age  Genitalia and Groin:     No rashes present, no lesions present, no areas of discoloration, no masses present    COUNSELING:   Reviewed preventive health counseling, as reflected in patient instructions  Special attention given to:        Regular exercise       Healthy diet/nutrition       Osteoporosis Prevention/Bone Health    BMI: Body mass index is 21.61 kg/(m^2).      ASSESSMENT:  61 year old female with satisfactory annual exam.    ICD-10-CM    1. Encounter for gynecological examination without abnormal finding Z01.419    2. Screen for colon cancer Z12.11    3. Osteoporosis M81.0 alendronate (FOSAMAX) 70 MG tablet       PLAN:  Patient Instructions   Follow up with your primary care provider for your other medical problems.  Continue self breast exam.  Increase physical activity and exercise.  Usual safety and preventative measures counseling done.  Last pap smear (2013) was normal and negative for the DNA of high risk HPV subtypes.  No pap was obtained this year.  This was discussed with the patient and she agrees with the plan.  Discussed Osteoporosis screening as well as calcium and Vitamin D recommendations.  Will continue weekly fosamax this year and repeat bone scan in 2018.  Will have results of colonscopy sent to us in May from [place]  [facility]      Terra Fraser MD

## 2017-04-07 ASSESSMENT — ANXIETY QUESTIONNAIRES: GAD7 TOTAL SCORE: 1

## 2017-04-07 ASSESSMENT — PATIENT HEALTH QUESTIONNAIRE - PHQ9: SUM OF ALL RESPONSES TO PHQ QUESTIONS 1-9: 1

## 2017-09-29 ENCOUNTER — TRANSFERRED RECORDS (OUTPATIENT)
Dept: HEALTH INFORMATION MANAGEMENT | Facility: CLINIC | Age: 62
End: 2017-09-29

## 2017-10-03 PROBLEM — K63.5 COLON POLYP: Status: ACTIVE | Noted: 2017-09-01

## 2018-01-17 DIAGNOSIS — Z12.83 SKIN EXAM FOR MALIGNANT NEOPLASM: ICD-10-CM

## 2018-01-17 NOTE — TELEPHONE ENCOUNTER
desonide (DESOWEN) 0.05 % cream      Last Written Prescription Date: 8/23/2016  Last Fill Quantity: 15,   # refills: 0  Last Office Visit: 2/20/2017  Future Office visit:       Routing refill request to provider for review/approval because:  Drug not on the [facility], [facility] or [facility] refill protocol or controlled substance

## 2018-01-18 NOTE — TELEPHONE ENCOUNTER
Requested Prescriptions   Pending Prescriptions Disp Refills     desonide (DESOWEN) 0.05 % cream [Pharmacy Med Name: Desonide External Cream 0.05 %] 15 g 0     Sig: APPLY SPARINGLY TO AFFECTED AREA TWO TIMES PER DAY FOR NO MORE THEN 3 -5 CONSECUTIVE DAYS    There is no refill protocol information for this order

## 2018-01-19 RX ORDER — DESONIDE 0.5 MG/G
CREAM TOPICAL
Qty: 15 G | Refills: 0 | Status: SHIPPED | OUTPATIENT
Start: 2018-01-19 | End: 2021-04-13

## 2018-02-13 DIAGNOSIS — M81.0 OSTEOPOROSIS: ICD-10-CM

## 2018-02-14 RX ORDER — ALENDRONATE SODIUM 70 MG/1
TABLET ORAL
Qty: 4 TABLET | Refills: 0 | Status: SHIPPED | OUTPATIENT
Start: 2018-02-14 | End: 2018-04-10

## 2018-02-14 NOTE — TELEPHONE ENCOUNTER
alendronate (FOSAMAX) 70 MG tablet     Last Written Prescription Date:  4/6/17  Last Fill Quantity: 12,   # refills: 3  Last Office Visit with G primary care provider:  4/6/17  Future Office visit: None    Routing refill request to provider for review/approval because:  Refill sent for 30 days. Pt will be due for annual, no appointment made.

## 2018-02-28 ENCOUNTER — OFFICE VISIT (OUTPATIENT)
Dept: FAMILY MEDICINE | Facility: CLINIC | Age: 63
End: 2018-02-28
Payer: COMMERCIAL

## 2018-02-28 DIAGNOSIS — Z80.8 FAMILY HISTORY OF NONMELANOMA SKIN CANCER: ICD-10-CM

## 2018-02-28 DIAGNOSIS — Z87.2 HISTORY OF ACTINIC KERATOSES: ICD-10-CM

## 2018-02-28 DIAGNOSIS — Z12.83 SKIN CANCER SCREENING: Primary | ICD-10-CM

## 2018-02-28 DIAGNOSIS — L81.4 SOLAR LENTIGO: ICD-10-CM

## 2018-02-28 DIAGNOSIS — D22.9 MULTIPLE BENIGN MELANOCYTIC NEVI: ICD-10-CM

## 2018-02-28 PROCEDURE — 99214 OFFICE O/P EST MOD 30 MIN: CPT | Performed by: FAMILY MEDICINE

## 2018-02-28 NOTE — LETTER
"    2/28/2018         RE: Jeniffer Meyer  [address] [place] [address]        Dear Colleague,    Thank you for referring your patient, Jeniffer Meyer, to the [facility] [place]. Please see a copy of my visit note below.    [facility] - PRIMARY CARE SKIN    CC : skin cancer screening (full-body)  SUBJECTIVE:                                                    Jeniffer Meyer is a 62 year old female who presents to clinic today for a full-body skin exam.    Bothersome lesions noticed by the patient or other skin concerns :  Issue One : A bump occurred on the left chin. She reports that this bump was \"like a hive\", and she has had 3-4 episodes in her lifetime. This bump became \"crusty or scabby\", and multiple canker sores in the mouth also developed at the same time. She has used OTC topical steroid and antibiotic, and it has been resolving.  She denies any blistering nor acne-like appearance nor tingling sensations. She denies a history of cold sores.  Onset : Jan 2018, 1 month ago.  Enlarging : waxing and waning.  Bleeding : NO  Itchy or irritating : YES.  Pain or tenderness : YES.  Changing color : NO.    Issue Two : A brown lesion on the right anterior lower leg. She recalls several instances of trauma to the site a year ago, and the color change has persisted. She is also concerned about a scar adjacent to the brown lesion.    Issue Three : An  has noticed a lesion on the upper lip. After blading treatment, this lesion appears to have resolved. Hydrafacials are down twice yearly with blading treatments in between hydrafacials.    Personal history of skin cancer : NO - history of actinic keratoses.  Eczema : YES, psoriasis : NO  Family history of skin cancer : YES - non-melanoma skin cancer in both father and brother.  Eczema : NO, psoriasis : NO    Sun Exposure History  Previous history of significant sun exposure:  Blistering sunburns : YES - when younger  Tanning beds : YES - " when younger.  Sunscreen Use : YES, frequency : daily.  UV-protective clothing use : YES    Occupation : [employer].    Refer to electronic medical record (EMR) for past medical history and medications.    INTEGUMENTARY/SKIN: POSITIVE for lumps or bumps  ROS : 14 point review of systems was negative except the symptoms listed above in the HPI.    This document serves as a record of the services and decisions personally performed and made by Karen Ibrahim MD. It was created on her behalf by Rd Jones, a trained medical scribe.  The creation of this document is based on the scribe's personal observations and the provider's statements to the medical scribe.  Rd Jones, February 28, 2018 11:54 AM      OBJECTIVE:                                                    GENERAL: healthy, alert and no distress  SKIN: Sparks Skin Type - II.  This patient was examined from the top of the head to the bottom of the feet  including scalp, face, neck, back, chest, breasts, buttocks, both arms, both legs, both hands, both feet, all 10 fingers and all 10 toes. The dermatoscope was used to help evaluate pigmented lesions.  Skin Pertinent Findings:  Chest : brown, macule(s) most consistent with benign solar lentigo on upper chest.    Anterior legs : Multiple brown, macule(s) most consistent with benign solar lentigo. Scattered brown macules most consistent with (benign) melanocytic nevi    Left medial calcaneus : 3 x 2 mm in size brown macule(s) most consistent with benign nevus(i).    Back : Multiple brown, macule(s) most consistent with benign solar lentigo.    Posterior lower legs : brown, macule(s) most consistent with benign solar lentigo.    Diagnostic Test Results:  none     MDM : uncertain of the etiology for the rash on the left chin, it certainly good have been a cold sore, no treatment at this time. If she develops a second episode then she will take a picture and we can evaluate..      ASSESSMENT:                  "                                     Encounter Diagnoses   Name Primary?     Skin cancer screening Yes     Family history of nonmelanoma skin cancer      History of actinic keratoses      Solar lentigo      Multiple benign melanocytic nevi          PLAN:                                                    Patient Instructions   FUTURE APPOINTMENTS  Follow up in 1 year(s) for a full-body skin cancer screening.    SUN PROTECTION INSTRUCTIONS  Sun damage can lead to skin cancer and premature aging of the skin.      The best way to protect from sun damage to your skin is to avoid the sun during peak hours (10 am - 2 pm) even on overcast days.      Use UPF sun-protective clothing, which while more expensive initially provides longer lasting coverage without having to worry about remembering to re-apply.  1. Wear a wide-brimmed hat and sunglasses.   2. Wear sun-protective clothing.  [employer] and other [employer] make sun protective clothing that are stylish, comfortable and cool. [employer] and other [employer] make UV arm sleeves suitable for golfing, gardening and other activities.      Sunscreen instructions:  1. Use sunscreens with Zinc Oxide, Titanium Dioxide or Avobenzone to protect from UVA rays.  2. Use SPF 30-50+ to protect from UVB rays.  3. Re-apply every 2 hours even if water resistant.  4. Apply on your face every day even when cloudy and even in the winter. UVA \"aging rays\" penetrate window glass and are just as strong in the winter as in the summer.    Product Recommendations:    Good examples include: Blue Lizard, EltaMD, Solbar    Good daily moisturizers with SPF: Vanicream, CeraVe.    For sensitive skin, consider : SkinMedica Essential Defense Mineral Shield Broad Spectrum SPF 35      Never use tanning beds. Tanning beds are associated with much higher risks of skin cancer.    All tanning damages the skin. Aim for ivory skin year round and you will have less trouble with your skin in years to " "come. There is no merit in getting \"a base tan\" before a warm weather vacation, as any tanning indicates your body's response to sun damage.    Stop smoking. Smokers have higher rates of skin cancer and also have premature skin wrinkling.    FYI  You should use about 3 tablespoons of sunscreen to protect your whole body. Thus a typical eight ounce bottle of sunscreen should last 4 applications. Remember, that the SPF rating is compromised if you don t apply enough. Most people only apply 1/2 - 1/3 of the amount they need. Also don t forget areas such as your ears, feet, upper back and harder to reach places. Keep in mind that these amounts should be increased for larger body sizes.    Sunscreens with titanium dioxide and/or zinc oxide in the active ingredients are physical blockers as opposed to chemical blockers. Chemical-free sunscreens should not irritate the skin.    Spray-on sunscreens may be used for touch-up application only, not as a base layer. Also, use with caution around small children due to inhalation risk.    Avoid retinyl palmitate products.    Avoid combination products that include both sunscreen and insect repellant, as sunscreen should be applied every 2 hours, but insect repellant should not be applied as frequently.    SPF means sun protection factor, which is just the degree to which the sunscreen can protect against UVB rays. There is no rating system for UVA rays. SPF is calculated as the time skin will burn when sunscreen is applied vs. skin without sunscreen.    Water resistant sunscreens should be re-applied every 1-2 hours.    For more information:  http://www.skincancer.org/prevention/sun-protection/sunscreen/sunscreens-safe-and-effective      The patient was counseled about sunscreens and sun avoidance. The patient was counseled to check the skin regularly and report any lesion that is new, changing, itching, scabbing, bleeding or otherwise bothersome. The patient was discharged " ambulatory and in stable condition.      PROCEDURES:                                                    None.    TT : 25 minutes.  CT : 15 minutes.      The information in this document, created by the medical scribe for me, accurately reflects the services I personally performed and the decisions made by me. I have reviewed and approved this document for accuracy prior to leaving the patient care area.  Karen Ibrahim MD February 28, 2018 11:54 AM  [facility]    Again, thank you for allowing me to participate in the care of your patient.        Sincerely,        Jinny Ibrahim MD

## 2018-02-28 NOTE — PATIENT INSTRUCTIONS
"FUTURE APPOINTMENTS  Follow up in 1 year(s) for a full-body skin cancer screening.    SUN PROTECTION INSTRUCTIONS  Sun damage can lead to skin cancer and premature aging of the skin.      The best way to protect from sun damage to your skin is to avoid the sun during peak hours (10 am - 2 pm) even on overcast days.      Use UPF sun-protective clothing, which while more expensive initially provides longer lasting coverage without having to worry about remembering to re-apply.  1. Wear a wide-brimmed hat and sunglasses.   2. Wear sun-protective clothing.  [employer]TV and other [employer] make sun protective clothing that are stylish, comfortable and cool. Telisma and other [employer] make UV arm sleeves suitable for golfing, gardening and other activities.      Sunscreen instructions:  1. Use sunscreens with Zinc Oxide, Titanium Dioxide or Avobenzone to protect from UVA rays.  2. Use SPF 30-50+ to protect from UVB rays.  3. Re-apply every 2 hours even if water resistant.  4. Apply on your face every day even when cloudy and even in the winter. UVA \"aging rays\" penetrate window glass and are just as strong in the winter as in the summer.    Product Recommendations:    Good examples include: Blue Lizard, EltaMD, Solbar    Good daily moisturizers with SPF: Vanicream, CeraVe.    For sensitive skin, consider : SkinMedica Essential Defense Mineral Shield Broad Spectrum SPF 35      Never use tanning beds. Tanning beds are associated with much higher risks of skin cancer.    All tanning damages the skin. Aim for ivory skin year round and you will have less trouble with your skin in years to come. There is no merit in getting \"a base tan\" before a warm weather vacation, as any tanning indicates your body's response to sun damage.    Stop smoking. Smokers have higher rates of skin cancer and also have premature skin wrinkling.    FYI  You should use about 3 tablespoons of sunscreen to protect your whole body. Thus a " typical eight ounce bottle of sunscreen should last 4 applications. Remember, that the SPF rating is compromised if you don t apply enough. Most people only apply 1/2 - 1/3 of the amount they need. Also don t forget areas such as your ears, feet, upper back and harder to reach places. Keep in mind that these amounts should be increased for larger body sizes.    Sunscreens with titanium dioxide and/or zinc oxide in the active ingredients are physical blockers as opposed to chemical blockers. Chemical-free sunscreens should not irritate the skin.    Spray-on sunscreens may be used for touch-up application only, not as a base layer. Also, use with caution around small children due to inhalation risk.    Avoid retinyl palmitate products.    Avoid combination products that include both sunscreen and insect repellant, as sunscreen should be applied every 2 hours, but insect repellant should not be applied as frequently.    SPF means sun protection factor, which is just the degree to which the sunscreen can protect against UVB rays. There is no rating system for UVA rays. SPF is calculated as the time skin will burn when sunscreen is applied vs. skin without sunscreen.    Water resistant sunscreens should be re-applied every 1-2 hours.    For more information:  http://www.skincancer.org/prevention/sun-protection/sunscreen/sunscreens-safe-and-effective

## 2018-02-28 NOTE — MR AVS SNAPSHOT
"              After Visit Summary   2/28/2018    Jeniffer Meyer    MRN: [id number]           Patient Information     Date Of Birth          1955        Visit Information        Provider Department      2/28/2018 12:00 PM Jinny Ibrahim MD [facility]        Today's Diagnoses     Skin cancer screening    -  1    Family history of nonmelanoma skin cancer        History of actinic keratoses        Solar lentigo        Multiple benign melanocytic nevi          Care Instructions    FUTURE APPOINTMENTS  Follow up in 1 year(s) for a full-body skin cancer screening.    SUN PROTECTION INSTRUCTIONS  Sun damage can lead to skin cancer and premature aging of the skin.      The best way to protect from sun damage to your skin is to avoid the sun during peak hours (10 am - 2 pm) even on overcast days.      Use UPF sun-protective clothing, which while more expensive initially provides longer lasting coverage without having to worry about remembering to re-apply.  1. Wear a wide-brimmed hat and sunglasses.   2. Wear sun-protective clothing.  [employer] and other [employer] make sun protective clothing that are stylish, comfortable and cool. [employer] and other [employer] make UV arm sleeves suitable for golfing, gardening and other activities.      Sunscreen instructions:  1. Use sunscreens with Zinc Oxide, Titanium Dioxide or Avobenzone to protect from UVA rays.  2. Use SPF 30-50+ to protect from UVB rays.  3. Re-apply every 2 hours even if water resistant.  4. Apply on your face every day even when cloudy and even in the winter. UVA \"aging rays\" penetrate window glass and are just as strong in the winter as in the summer.    Product Recommendations:    Good examples include: Manuel Warner, EltaMD, Solbar    Good daily moisturizers with SPF: Vanicream, CeraVe.    For sensitive skin, consider : SkinMedica Essential Defense Mineral Shield Broad Spectrum SPF 35      Never use tanning beds. " "Tanning beds are associated with much higher risks of skin cancer.    All tanning damages the skin. Aim for ivory skin year round and you will have less trouble with your skin in years to come. There is no merit in getting \"a base tan\" before a warm weather vacation, as any tanning indicates your body's response to sun damage.    Stop smoking. Smokers have higher rates of skin cancer and also have premature skin wrinkling.    FYI  You should use about 3 tablespoons of sunscreen to protect your whole body. Thus a typical eight ounce bottle of sunscreen should last 4 applications. Remember, that the SPF rating is compromised if you don t apply enough. Most people only apply 1/2 - 1/3 of the amount they need. Also don t forget areas such as your ears, feet, upper back and harder to reach places. Keep in mind that these amounts should be increased for larger body sizes.    Sunscreens with titanium dioxide and/or zinc oxide in the active ingredients are physical blockers as opposed to chemical blockers. Chemical-free sunscreens should not irritate the skin.    Spray-on sunscreens may be used for touch-up application only, not as a base layer. Also, use with caution around small children due to inhalation risk.    Avoid retinyl palmitate products.    Avoid combination products that include both sunscreen and insect repellant, as sunscreen should be applied every 2 hours, but insect repellant should not be applied as frequently.    SPF means sun protection factor, which is just the degree to which the sunscreen can protect against UVB rays. There is no rating system for UVA rays. SPF is calculated as the time skin will burn when sunscreen is applied vs. skin without sunscreen.    Water resistant sunscreens should be re-applied every 1-2 hours.    For more information:  http://www.skincancer.org/prevention/sun-protection/sunscreen/sunscreens-safe-and-effective            Follow-ups after your visit        Your next 10 " "appointments already scheduled     Apr 10, 2018 11:00 AM CDT   (Arrive by 10:45 AM)   MA SCREENING DIGITAL BILATERAL with [email or web address]   [facility] [place] ([facility] [place])    [address] [place] [address], [facility] [address]  [place] [id number] [phone]           Do not use any powder, lotion or deodorant under your arms or on your breast. If you do, we will ask you to remove it before your exam.  Wear comfortable, two-piece clothing.  If you have any allergies, tell your care team.  Bring any previous mammograms from other facilities or have them mailed to the breast center. Three-dimensional (3D) mammograms are available at [facility] locations in [facility], [place], [facility], [facility], and [place]. [facility] locations include [place] and [facility] in [place]. Benefits of 3D mammograms include: - Improved rate of cancer detection - Decreases your chance of having to go back for more tests, which means fewer: - \"False-positive\" results (This means that there is an abnormal area but it isn't cancer.) - Invasive testing procedures, such as a biopsy or surgery - Can provide clearer images of the breast if you have dense breast tissue. 3D mammography is an optional exam that anyone can have with a 2D mammogram. It doesn't replace or take the place of a 2D mammogram. 2D mammograms remain an effective screening test for all women.  Not all insurance companies cover the cost of a 3D mammogram. Check with your insurance.            Apr 10, 2018 11:30 AM CDT   DX HIP/PELVIS/SPINE with [email or web address]   [facility] [place] ([facility] [place])    [address] [place] [address]  [address]  [facility] [id number] [phone]           Please do not take any of the following 24 hours prior to the day of your exam: vitamins, calcium tablets, antacids.  If possible, please wear clothes without metal (snaps, zippers). A sweatsuit works well.         " "   Apr 10, 2018  1:30 PM CDT   PHYSICAL with Terra Fraser MD   [facility] [place] ([facility] [place])    [address] [place] [address] [phone]              Who to contact     If you have questions or need follow up information about today's clinic visit or your schedule please contact [facility] [place] directly at [phone].  Normal or non-critical lab and imaging results will be communicated to you by [employer], letter or phone within 4 business days after the clinic has received the results. If you do not hear from us within 7 days, please contact the clinic through [employer] or phone. If you have a critical or abnormal lab result, we will notify you by phone as soon as possible.  Submit refill requests through [employer] or call your pharmacy and they will forward the refill request to us. Please allow 3 business days for your refill to be completed.          Additional Information About Your Visit        [employer] Information     [employer] lets you send messages to your doctor, view your test results, renew your prescriptions, schedule appointments and more. To sign up, go to [email or web address] . Click on \"Log in\" on the left side of the screen, which will take you to the Welcome page. Then click on \"Sign up Now\" on the right side of the page.     You will be asked to enter the access code listed below, as well as some personal information. Please follow the directions to create your username and password.     Your access code is: [email or web address]  Expires: 2018 12:18 PM     Your access code will  in 90 days. If you need help or a new code, please call your [facility] clinic or [phone].        Care EveryWhere ID     This is your Care EveryWhere ID. This could be used by other organizations to access your [facility] medical records  [id number]         Blood Pressure from Last 3 Encounters:   17 146/90   16 110/72 "   03/27/15 128/68    Weight from Last 3 Encounters:   04/06/17 138 lb (62.6 kg)   03/31/16 135 lb (61.2 kg)   03/27/15 137 lb (62.1 kg)              Today, you had the following     No orders found for display       Primary Care Provider Office Phone # Fax #    Jessica Jaffe [phone]       ALICE WINKLER [address] [facility] [address]  [facility] [address]        Equal Access to Services     ALYSON PLASCENCIA : Hadii aad ku hadasho Soomaali, waaxda luqadaha, qaybta kaalmada adeegyada, waxay idiin hayaan adeeg kharash laerica . So [facility] [phone].    ATENCIÓN: Si habla español, tiene a levi disposición servicios gratuitos de asistencia lingüística. Llame al [phone].    We comply with applicable federal civil rights laws and [place] laws. We do not discriminate on the basis of race, color, national origin, age, disability, sex, sexual orientation, or gender identity.            Thank you!     Thank you for choosing [facility]  for your care. Our goal is always to provide you with excellent care. Hearing back from our patients is one way we can continue to improve our services. Please take a few minutes to complete the written survey that you may receive in the mail after your visit with us. Thank you!             Your Updated Medication List - Protect others around you: Learn how to safely use, store and throw away your medicines at www.disposemymeds.org.          This list is accurate as of 2/28/18 12:18 PM.  Always use your most recent med list.                   Brand Name Dispense Instructions for use Diagnosis    alendronate 70 MG tablet    FOSAMAX    4 tablet    TAKE 1 TABLET BY MOUTH  EVERY 7 DAYS    Osteoporosis       desonide 0.05 % cream    DESOWEN    15 g    APPLY SPARINGLY TO AFFECTED AREA TWO TIMES PER DAY FOR NO MORE THEN 3 -5 CONSECUTIVE DAYS    Skin exam for malignant neoplasm       FIBERCON PO           MAGNESIUM OXIDE PO           MULTIVITAMIN PO            triamterene-hydrochlorothiazide 18.75-12.5 mg per half-tab    MAXZIDE-25     Take  by mouth daily.        VITAMIN C PO

## 2018-02-28 NOTE — PROGRESS NOTES
"[facility] - PRIMARY CARE SKIN    CC : skin cancer screening (full-body)  SUBJECTIVE:                                                    Jeniffer Meyer is a 62 year old female who presents to clinic today for a full-body skin exam.    Bothersome lesions noticed by the patient or other skin concerns :  Issue One : A bump occurred on the left chin. She reports that this bump was \"like a hive\", and she has had 3-4 episodes in her lifetime. This bump became \"crusty or scabby\", and multiple canker sores in the mouth also developed at the same time. She has used OTC topical steroid and antibiotic, and it has been resolving.  She denies any blistering nor acne-like appearance nor tingling sensations. She denies a history of cold sores.  Onset : Jan 2018, 1 month ago.  Enlarging : waxing and waning.  Bleeding : NO  Itchy or irritating : YES.  Pain or tenderness : YES.  Changing color : NO.    Issue Two : A brown lesion on the right anterior lower leg. She recalls several instances of trauma to the site a year ago, and the color change has persisted. She is also concerned about a scar adjacent to the brown lesion.    Issue Three : An  has noticed a lesion on the upper lip. After blading treatment, this lesion appears to have resolved. Hydrafacials are down twice yearly with blading treatments in between hydrafacials.    Personal history of skin cancer : NO - history of actinic keratoses.  Eczema : YES, psoriasis : NO  Family history of skin cancer : YES - non-melanoma skin cancer in both father and brother.  Eczema : NO, psoriasis : NO    Sun Exposure History  Previous history of significant sun exposure:  Blistering sunburns : YES - when younger  Tanning beds : YES - when younger.  Sunscreen Use : YES, frequency : daily.  UV-protective clothing use : YES    Occupation : [employer] ([place]    Refer to electronic medical record (EMR) for past medical history and medications.    INTEGUMENTARY/SKIN: POSITIVE for " lumps or bumps  ROS : 14 point review of systems was negative except the symptoms listed above in the HPI.    This document serves as a record of the services and decisions personally performed and made by Karen Ibrahim MD. It was created on her behalf by Rd Jones, a trained medical scribe.  The creation of this document is based on the scribe's personal observations and the provider's statements to the medical scribe.  Rd Jones, February 28, 2018 11:54 AM      OBJECTIVE:                                                    GENERAL: healthy, alert and no distress  SKIN: Sparks Skin Type - II.  This patient was examined from the top of the head to the bottom of the feet  including scalp, face, neck, back, chest, breasts, buttocks, both arms, both legs, both hands, both feet, all 10 fingers and all 10 toes. The dermatoscope was used to help evaluate pigmented lesions.  Skin Pertinent Findings:  Chest : brown, macule(s) most consistent with benign solar lentigo on upper chest.    Anterior legs : Multiple brown, macule(s) most consistent with benign solar lentigo. Scattered brown macules most consistent with (benign) melanocytic nevi    Left medial calcaneus : 3 x 2 mm in size brown macule(s) most consistent with benign nevus(i).    Back : Multiple brown, macule(s) most consistent with benign solar lentigo.    Posterior lower legs : brown, macule(s) most consistent with benign solar lentigo.    Diagnostic Test Results:  none     MDM : uncertain of the etiology for the rash on the left chin, it certainly good have been a cold sore, no treatment at this time. If she develops a second episode then she will take a picture and we can evaluate..      ASSESSMENT:                                                      Encounter Diagnoses   Name Primary?     Skin cancer screening Yes     Family history of nonmelanoma skin cancer      History of actinic keratoses      Solar lentigo      Multiple benign melanocytic nevi   "        PLAN:                                                    Patient Instructions   FUTURE APPOINTMENTS  Follow up in 1 year(s) for a full-body skin cancer screening.    SUN PROTECTION INSTRUCTIONS  Sun damage can lead to skin cancer and premature aging of the skin.      The best way to protect from sun damage to your skin is to avoid the sun during peak hours (10 am - 2 pm) even on overcast days.      Use UPF sun-protective clothing, which while more expensive initially provides longer lasting coverage without having to worry about remembering to re-apply.  1. Wear a wide-brimmed hat and sunglasses.   2. Wear sun-protective clothing.  [employer] and other [employer] make sun protective clothing that are stylish, comfortable and cool. [employer] and other [employer] make UV arm sleeves suitable for golfing, gardening and other activities.      Sunscreen instructions:  1. Use sunscreens with Zinc Oxide, Titanium Dioxide or Avobenzone to protect from UVA rays.  2. Use SPF 30-50+ to protect from UVB rays.  3. Re-apply every 2 hours even if water resistant.  4. Apply on your face every day even when cloudy and even in the winter. UVA \"aging rays\" penetrate window glass and are just as strong in the winter as in the summer.    Product Recommendations:    Good examples include: Blue Lizard, EltaMD, Solbar    Good daily moisturizers with SPF: Vanicream, CeraVe.    For sensitive skin, consider : SkinMedica Essential Defense Mineral Shield Broad Spectrum SPF 35      Never use tanning beds. Tanning beds are associated with much higher risks of skin cancer.    All tanning damages the skin. Aim for ivory skin year round and you will have less trouble with your skin in years to come. There is no merit in getting \"a base tan\" before a warm weather vacation, as any tanning indicates your body's response to sun damage.    Stop smoking. Smokers have higher rates of skin cancer and also have premature skin " wrinkling.    FYI  You should use about 3 tablespoons of sunscreen to protect your whole body. Thus a typical eight ounce bottle of sunscreen should last 4 applications. Remember, that the SPF rating is compromised if you don t apply enough. Most people only apply 1/2 - 1/3 of the amount they need. Also don t forget areas such as your ears, feet, upper back and harder to reach places. Keep in mind that these amounts should be increased for larger body sizes.    Sunscreens with titanium dioxide and/or zinc oxide in the active ingredients are physical blockers as opposed to chemical blockers. Chemical-free sunscreens should not irritate the skin.    Spray-on sunscreens may be used for touch-up application only, not as a base layer. Also, use with caution around small children due to inhalation risk.    Avoid retinyl palmitate products.    Avoid combination products that include both sunscreen and insect repellant, as sunscreen should be applied every 2 hours, but insect repellant should not be applied as frequently.    SPF means sun protection factor, which is just the degree to which the sunscreen can protect against UVB rays. There is no rating system for UVA rays. SPF is calculated as the time skin will burn when sunscreen is applied vs. skin without sunscreen.    Water resistant sunscreens should be re-applied every 1-2 hours.    For more information:  http://www.skincancer.org/prevention/sun-protection/sunscreen/sunscreens-safe-and-effective      The patient was counseled about sunscreens and sun avoidance. The patient was counseled to check the skin regularly and report any lesion that is new, changing, itching, scabbing, bleeding or otherwise bothersome. The patient was discharged ambulatory and in stable condition.      PROCEDURES:                                                    None.    TT : 25 minutes.  CT : 15 minutes.      The information in this document, created by the medical scribe for me,  accurately reflects the services I personally performed and the decisions made by me. I have reviewed and approved this document for accuracy prior to leaving the patient care area.  Karen Ibrahim MD February 28, 2018 11:54 AM  [facility]

## 2018-04-10 ENCOUNTER — OFFICE VISIT (OUTPATIENT)
Dept: OBGYN | Facility: CLINIC | Age: 63
End: 2018-04-10
Payer: COMMERCIAL

## 2018-04-10 ENCOUNTER — RADIANT APPOINTMENT (OUTPATIENT)
Dept: BONE DENSITY | Facility: CLINIC | Age: 63
End: 2018-04-10
Payer: COMMERCIAL

## 2018-04-10 ENCOUNTER — RADIANT APPOINTMENT (OUTPATIENT)
Dept: MAMMOGRAPHY | Facility: CLINIC | Age: 63
End: 2018-04-10
Payer: COMMERCIAL

## 2018-04-10 VITALS
WEIGHT: 136 LBS | BODY MASS INDEX: 21.35 KG/M2 | HEIGHT: 67 IN | DIASTOLIC BLOOD PRESSURE: 80 MMHG | SYSTOLIC BLOOD PRESSURE: 132 MMHG | HEART RATE: 72 BPM

## 2018-04-10 DIAGNOSIS — Z78.0 ASYMPTOMATIC POSTMENOPAUSAL STATE: ICD-10-CM

## 2018-04-10 DIAGNOSIS — Z12.31 VISIT FOR SCREENING MAMMOGRAM: ICD-10-CM

## 2018-04-10 DIAGNOSIS — I10 HYPERTENSION, ESSENTIAL: ICD-10-CM

## 2018-04-10 DIAGNOSIS — M81.0 OSTEOPOROSIS, SENILE: ICD-10-CM

## 2018-04-10 DIAGNOSIS — Z01.419 ENCOUNTER FOR GYNECOLOGICAL EXAMINATION WITHOUT ABNORMAL FINDING: Primary | ICD-10-CM

## 2018-04-10 PROCEDURE — 77067 SCR MAMMO BI INCL CAD: CPT | Mod: TC

## 2018-04-10 PROCEDURE — 77063 BREAST TOMOSYNTHESIS BI: CPT | Mod: TC

## 2018-04-10 PROCEDURE — 87624 HPV HI-RISK TYP POOLED RSLT: CPT | Performed by: OBSTETRICS & GYNECOLOGY

## 2018-04-10 PROCEDURE — 77080 DXA BONE DENSITY AXIAL: CPT | Performed by: OBSTETRICS & GYNECOLOGY

## 2018-04-10 PROCEDURE — 99396 PREV VISIT EST AGE 40-64: CPT | Performed by: OBSTETRICS & GYNECOLOGY

## 2018-04-10 PROCEDURE — G0145 SCR C/V CYTO,THINLAYER,RESCR: HCPCS | Performed by: OBSTETRICS & GYNECOLOGY

## 2018-04-10 RX ORDER — ALENDRONATE SODIUM 70 MG/1
TABLET ORAL
Qty: 12 TABLET | Refills: 3 | Status: SHIPPED | OUTPATIENT
Start: 2018-04-10 | End: 2019-03-07

## 2018-04-10 RX ORDER — AMLODIPINE BESYLATE 5 MG/1
TABLET ORAL
COMMUNITY
Start: 2018-04-10 | End: 2020-07-21

## 2018-04-10 ASSESSMENT — ANXIETY QUESTIONNAIRES
7. FEELING AFRAID AS IF SOMETHING AWFUL MIGHT HAPPEN: NOT AT ALL
2. NOT BEING ABLE TO STOP OR CONTROL WORRYING: NOT AT ALL
3. WORRYING TOO MUCH ABOUT DIFFERENT THINGS: SEVERAL DAYS
IF YOU CHECKED OFF ANY PROBLEMS ON THIS QUESTIONNAIRE, HOW DIFFICULT HAVE THESE PROBLEMS MADE IT FOR YOU TO DO YOUR WORK, TAKE CARE OF THINGS AT HOME, OR GET ALONG WITH OTHER PEOPLE: NOT DIFFICULT AT ALL
6. BECOMING EASILY ANNOYED OR IRRITABLE: SEVERAL DAYS
GAD7 TOTAL SCORE: 3
5. BEING SO RESTLESS THAT IT IS HARD TO SIT STILL: NOT AT ALL
1. FEELING NERVOUS, ANXIOUS, OR ON EDGE: SEVERAL DAYS

## 2018-04-10 ASSESSMENT — PATIENT HEALTH QUESTIONNAIRE - PHQ9: 5. POOR APPETITE OR OVEREATING: NOT AT ALL

## 2018-04-10 NOTE — PATIENT INSTRUCTIONS
Follow up with your primary care provider for your other medical problems.  Continue self breast exam.  Increase physical activity and exercise.  Lab and pap smear results will be called to the patient.  Co-testing done today and discontinue if negative/normal.    Usual safety and preventative measures counseling done.  Discussed Osteoporosis screening as well as calcium and Vitamin D recommendations.  Will continue with weekly fosamax and plan to repeat bone scan in 2yrs.  If stable or improved, may consider drug holiday or different medication.

## 2018-04-10 NOTE — LETTER
[address] [address]  Phone: [phone]  Fax: [phone]      Jeniffer ASIF Meyer     Date:  4/11/2018   [address] [place] [address]      Thank you for having your DEXA scan performed.  As you recall, the DEXA scan evaluates the strength of your bones.  It is important to know the strength of your bones to help you avoid breaking bones in the future.    A T score shows your risk for breaking a bone.  Normal bone has a T score of -0.9 or higher.  Some bone thinning (osteopenia) has a T score between -1.0 and -2.4.  A lot of bone thinning (osteoporosis) has a T score of -2.5 or lower.    Your T scores on 4/10/2018 were:     Left Hip:  -2.0 score    Right Hip:  -2.7 score    This T score shows you have some thinning (osteopenia) of your left hip and severe but stable thinning (osteoporosis)of your right hip. Compared with your previous dexa scan, this shows stability and small amount of improvement.    Spine:  -1.0 score      This T score shows you have some thinning (Osteopenia) Compared with your previous scan, this shows more bone loss.      The good news is that there are treatments for making your bones stronger, if you need them.    Ways you can make your bones stronger:    Weight bearing exercises such as walking.  Eat three servings of dairy a day, or take calcium each day (4056-4039 mg).  Take Vitamin D each day (1,000-2,000 IU).    For more information, please make an appointment with your Primary Care Provider, request a brochure from our office, or look on our website and go to the Bone Density page.     No need to make an appointment.    Jeniffer, continue Vitamin D, Calcium and exercise.    We will plan to continue your fosamax and repeat your bone scan in 2 years.  We may consider a medication change at that time depending on your results.    Thank you.    Terra Fraser MD

## 2018-04-10 NOTE — LETTER
April 18, 2018      Jeniffer Meyer  [address] [place] [address]    Dear ,      I am happy to inform you that your cervical cancer screening test (PAP smear) was normal and your Human Papillomavirus (HPV) test was negative.    Per current guidelines, you no longer need to have pap smears completed.    Please continue to be seen every year for an annual wellness visit and other preventative tests.     Please contact my office at [phone] if you have further questions.    Sincerely,      Terra Fraser MD/ronnell

## 2018-04-10 NOTE — PROGRESS NOTES
Jeniffer is a 62 year old  female who presents for annual exam.     Besides routine health maintenance, she has no other health concerns today .    HPI:  Here today for yearly exam --doing well.  Postmenopausal.  No vb/spotting.  Denies hot flushes or night sweats.  Not currently SA due to 's health issues.  Denies vaginal dryness/pain.  No bowel/bladder issues.  Rare leaking if she hasn't emptied her bladder regularily.  Usually voids whenever she gets the chance.    ; works as  for [employer] --usually doing 7-8 trips per month ([place] in winter and [place] in summer)  -staying active; doing cardio/strength training 4-5d/wk  +mammo already this am; +SBE --no concerns  PCP -Jessica Jaffe MD --saw her this AM; had bloodwork done with her office; manages blood pressure  Hx osteoporosis --restarted fosamax in 2016;  Had bone scan this AM with essentially unchanged numbers (spine -1.0, L hip -2.0, R hip -2.7) (-0.6, -2.2, -2.8 in 2016 respectively)        GYNECOLOGIC HISTORY:    No LMP recorded. Patient is postmenopausal.  Her current contraception method is: menopause.  She  reports that she has never smoked. She has never used smokeless tobacco.    Patient is not sexually active.  STD testing offered?  Declined  Last PHQ-9 score on record =   PHQ-9 SCORE 4/10/2018   Total Score 1     Last GAD7 score on record =   MENA-7 SCORE 4/10/2018   Total Score 3     Alcohol Score = 3    HEALTH MAINTENANCE:  Cholesterol: (No results found for: CHOL   Last Mammo: 15, Result: normal, Next Mammo: today   Pap: 3/8/13 neg, HPV-  Colonoscopy:  15, Result: polyp, Next Colonoscopy: 3 years.  Dexa: 14    Health maintenance updated:  yes    HISTORY:  Obstetric History       T1      L0     SAB0   TAB0   Ectopic0   Multiple0   Live Births0       # Outcome Date GA Lbr Van/2nd Weight Sex Delivery Anes PTL Lv   1 Term 93   5 lb 2 oz (2.325 kg) F  EPI        Name: Milvia           Patient Active Problem List   Diagnosis     Osteoporosis     Hypertension, essential     Family history of nonmelanoma skin cancer     History of actinic keratoses     Colon polyp     Past Surgical History:   Procedure Laterality Date     ENDOMETRIAL SAMPLING (BIOPSY)  4/2006    PMB-->path: benign     FOOT SURGERY       HAND SURGERY      thumb     HC ABLATION, ENDOMETRIAL, THERMAL, W/O HYSTEROSCOPIC GUIDANCE  6/2004    (S[facility]ra)     MAMMOPLASTY AUGMENTATION Bilateral     saline implants      Social History   Substance Use Topics     Smoking status: Never Smoker     Smokeless tobacco: Never Used     Alcohol use 0.0 oz/week     0 Standard drinks or equivalent per week      Problem (# of Occurrences) Relation (Name,Age of Onset)    Breast Cancer (2) Sister (52), Paternal Grandmother    Colon Cancer (1) Father    DIABETES (1) Mother    Fractures (1) Mother: Hip    HEART DISEASE (1) Brother    Hyperlipidemia (2) Father, Mother    Hypertension (2) Father, Mother    OSTEOPOROSIS (3) Mother, Maternal Grandmother, Maternal Grandfather            Current Outpatient Prescriptions   Medication Sig     amLODIPine (NORVASC) 5 MG tablet Take two tablets twice daily     Wheat Dextrin (BENEFIBER PO)      alendronate (FOSAMAX) 70 MG tablet TAKE 1 TABLET BY MOUTH  EVERY 7 DAYS     desonide (DESOWEN) 0.05 % cream APPLY SPARINGLY TO AFFECTED AREA TWO TIMES PER DAY FOR NO MORE THEN 3 -5 CONSECUTIVE DAYS     MAGNESIUM OXIDE PO      Multiple Vitamins-Minerals (MULTIVITAMIN PO)      Ascorbic Acid (VITAMIN C PO)      [DISCONTINUED] alendronate (FOSAMAX) 70 MG tablet TAKE 1 TABLET BY MOUTH  EVERY 7 DAYS     No current facility-administered medications for this visit.      Allergies   Allergen Reactions     Codeine Nausea     Oxycodone-Acetaminophen      PN: LW Reaction: GI Upset       Past medical, surgical, social and family histories were reviewed and updated in EPIC.    ROS:   12 point review of systems negative other than symptoms  "noted below.  Head: Nasal Congestion  Gastrointestinal: Heartburn  Neurologic: Dizziness  Musculoskeletal: Muscle Cramps  Psychiatric: Anxiety    EXAM:  /80  Pulse 72  Ht 5' 7\" (1.702 m)  Wt 136 lb (61.7 kg)  BMI 21.3 kg/m2   BMI: Body mass index is 21.3 kg/(m^2).    PHYSICAL EXAM:  Constitutional:  Appearance: Well nourished, well developed, alert, in no acute distress  Neck:  Lymph Nodes:  No lymphadenopathy present    Thyroid:  Gland size normal, nontender, no nodules or masses present  on palpation  Chest:  Respiratory Effort:  Breathing unlabored  Cardiovascular:    Heart: Auscultation:  Regular rate, normal rhythm, no murmurs present  Breasts: Inspection of Breasts:  No lymphadenopathy present., Palpation of Breasts and Axillae:  No masses present on palpation, no breast tenderness., Axillary Lymph Nodes:  No lymphadenopathy present. and No nodularity, asymmetry or nipple discharge bilaterally.; +IMPLANTS  Gastrointestinal:   Abdominal Examination:  Abdomen nontender to palpation, tone normal without rigidity or guarding, no masses present, umbilicus without lesions   Liver and Spleen:  No hepatomegaly present, liver nontender to palpation    Hernias:  No hernias present  Lymphatic: Lymph Nodes:  No other lymphadenopathy present  Skin:  General Inspection:  No rashes present, no lesions present, no areas of  discoloration    Genitalia and Groin:  No rashes present, no lesions present, no areas of  discoloration, no masses present  Neurologic/Psychiatric:    Mental Status:  Oriented X3     Pelvic Exam:  External Genitalia:     Normal appearance for age, no discharge present, no tenderness present, no inflammatory lesions present, color normal  Vagina:     Normal vaginal vault without central or paravaginal defects, ATROPHIC  Bladder:     Nontender to palpation  Urethra:   Urethral Body:  Urethra palpation normal, urethra structural support normal   Urethral Meatus:  No erythema or lesions " present  Cervix:     Appearance healthy, no lesions present, nontender to palpation, no bleeding present  Uterus:     Nontender to palpation, no masses present, position anteflexed, mobility: normal  Adnexa:     No adnexal tenderness present, no adnexal masses present  Perineum:     Perineum within normal limits, no evidence of trauma, no rashes or skin lesions present  Inguinal Lymph Nodes:     No lymphadenopathy present      COUNSELING:   Reviewed preventive health counseling, as reflected in patient instructions  Special attention given to:        Regular exercise       Healthy diet/nutrition       Osteoporosis Prevention/Bone Health       Colon cancer screening    BMI: Body mass index is 21.3 kg/(m^2).      ASSESSMENT:  62 year old female with satisfactory annual exam.    ICD-10-CM    1. Encounter for gynecological examination without abnormal finding Z01.419 Pap imaged thin layer screen with HPV - recommended age 30 - 65     HPV High Risk Types DNA Cervical   2. Hypertension, essential I10    3. Osteoporosis, senile M81.0 alendronate (FOSAMAX) 70 MG tablet       PLAN:  Patient Instructions   Follow up with your primary care provider for your other medical problems.  Continue self breast exam.  Increase physical activity and exercise.  Lab and pap smear results will be called to the patient.  Co-testing done today and discontinue if negative/normal.    Usual safety and preventative measures counseling done.  Discussed Osteoporosis screening as well as calcium and Vitamin D recommendations.  Will continue with weekly fosamax and plan to repeat bone scan in 2yrs.  If stable or improved, may consider drug holiday or different medication.      Terra Fraser MD

## 2018-04-10 NOTE — MR AVS SNAPSHOT
After Visit Summary   4/10/2018    Jeniffer Meyer    MRN: [id number]           Patient Information     Date Of Birth          1955        Visit Information        Provider Department      4/10/2018 1:30 PM Terra Fraser MD [facility]        Today's Diagnoses     Encounter for gynecological examination without abnormal finding    -  1    Hypertension, essential        Osteoporosis, senile          Care Instructions    Follow up with your primary care provider for your other medical problems.  Continue self breast exam.  Increase physical activity and exercise.  Lab and pap smear results will be called to the patient.  Co-testing done today and discontinue if negative/normal.    Usual safety and preventative measures counseling done.  Discussed Osteoporosis screening as well as calcium and Vitamin D recommendations.  Will continue with weekly fosamax and plan to repeat bone scan in 2yrs.  If stable or improved, may consider drug holiday or different medication.          Follow-ups after your visit        Follow-up notes from your care team     Return in about 1 year (around 4/10/2019) for Annual Exam.      Who to contact     If you have questions or need follow up information about today's clinic visit or your schedule please contact [facility] directly at [phone].  Normal or non-critical lab and imaging results will be communicated to you by MyChart, letter or phone within 4 business days after the clinic has received the results. If you do not hear from us within 7 days, please contact the clinic through MyChart or phone. If you have a critical or abnormal lab result, we will notify you by phone as soon as possible.  Submit refill requests through [employer] or call your pharmacy and they will forward the refill request to us. Please allow 3 business days for your refill to be completed.          Additional Information About Your Visit        MyChart  "Information     [employer] lets you send messages to your doctor, view your test results, renew your prescriptions, schedule appointments and more. To sign up, go to [email or web address] . Click on \"Log in\" on the left side of the screen, which will take you to the Welcome page. Then click on \"Sign up Now\" on the right side of the page.     You will be asked to enter the access code listed below, as well as some personal information. Please follow the directions to create your username and password.     Your access code is: [email or web address]  Expires: 2018  1:18 PM     Your access code will  in 90 days. If you need help or a new code, please call your [place] clinic or [phone].        Care EveryWhere ID     This is your Care EveryWhere ID. This could be used by other organizations to access your [place] medical records  [id number]        Your Vitals Were     Pulse Height BMI (Body Mass Index)             72 5' 7\" (1.702 m) 21.3 kg/m2          Blood Pressure from Last 3 Encounters:   04/10/18 132/80   17 146/90   16 110/72    Weight from Last 3 Encounters:   04/10/18 136 lb (61.7 kg)   17 138 lb (62.6 kg)   16 135 lb (61.2 kg)              We Performed the Following     HPV High Risk Types DNA Cervical     Pap imaged thin layer screen with HPV - recommended age 30 - 65          Today's Medication Changes          These changes are accurate as of 4/10/18  2:11 PM.  If you have any questions, ask your nurse or doctor.               These medicines have changed or have updated prescriptions.        Dose/Directions    alendronate 70 MG tablet   Commonly known as:  FOSAMAX   This may have changed:  See the new instructions.   Used for:  Osteoporosis, senile   Changed by:  Terra Fraser MD        TAKE 1 TABLET BY MOUTH  EVERY 7 DAYS   Quantity:  12 tablet   Refills:  3            Where to get your medicines      These medications were sent to [facility] - [place], [place] - " [address]  [address], [facility] [address]     Phone:  [phone]     alendronate 70 MG tablet                Primary Care Provider Office Phone # Fax #    Jessica Jaffe [phone]       ALICE WINKLER [address] [facility] [address]  [facility] [address]        Equal Access to Services     [facility]: Hadii aad ku hadasho Soomaali, waaxda luqadaha, qaybta kaalmada adeegyada, waxay idiin hayaan adeeg kharash la'aan ah. So [facility] [phone].    ATENCIÓN: Si habla español, tiene a levi disposición servicios gratuitos de asistencia lingüística. Inga al [phone].    We comply with applicable federal civil rights laws and [place] laws. We do not discriminate on the basis of race, color, national origin, age, disability, sex, sexual orientation, or gender identity.            Thank you!     Thank you for choosing [facility]  for your care. Our goal is always to provide you with excellent care. Hearing back from our patients is one way we can continue to improve our services. Please take a few minutes to complete the written survey that you may receive in the mail after your visit with us. Thank you!             Your Updated Medication List - Protect others around you: Learn how to safely use, store and throw away your medicines at www.disposemymeds.org.          This list is accurate as of 4/10/18  2:11 PM.  Always use your most recent med list.                   Brand Name Dispense Instructions for use Diagnosis    alendronate 70 MG tablet    FOSAMAX    12 tablet    TAKE 1 TABLET BY MOUTH  EVERY 7 DAYS    Osteoporosis, senile       amLODIPine 5 MG tablet    NORVASC     Take two tablets twice daily        BENEFIBER PO           desonide 0.05 % cream    DESOWEN    15 g    APPLY SPARINGLY TO AFFECTED AREA TWO TIMES PER DAY FOR NO MORE THEN 3 -5 CONSECUTIVE DAYS    Skin exam for malignant neoplasm       MAGNESIUM OXIDE PO           MULTIVITAMIN PO            VITAMIN C PO

## 2018-04-11 ASSESSMENT — ANXIETY QUESTIONNAIRES: GAD7 TOTAL SCORE: 3

## 2018-04-11 ASSESSMENT — PATIENT HEALTH QUESTIONNAIRE - PHQ9: SUM OF ALL RESPONSES TO PHQ QUESTIONS 1-9: 1

## 2018-04-12 LAB
COPATH REPORT: NORMAL
PAP: NORMAL

## 2018-04-16 LAB
FINAL DIAGNOSIS: NORMAL
HPV HR 12 DNA CVX QL NAA+PROBE: NEGATIVE
HPV16 DNA SPEC QL NAA+PROBE: NEGATIVE
HPV18 DNA SPEC QL NAA+PROBE: NEGATIVE
SPECIMEN DESCRIPTION: NORMAL
SPECIMEN SOURCE CVX/VAG CYTO: NORMAL

## 2018-04-18 PROBLEM — Z12.4 CERVICAL CANCER SCREENING: Status: RESOLVED | Noted: 2018-04-10 | Resolved: 2018-04-18

## 2018-04-18 PROBLEM — Z12.4 CERVICAL CANCER SCREENING: Status: ACTIVE | Noted: 2018-04-10

## 2018-06-14 DIAGNOSIS — Z23 NEED FOR SHINGLES VACCINE: Primary | ICD-10-CM

## 2018-06-14 PROCEDURE — 90750 HZV VACC RECOMBINANT IM: CPT

## 2018-06-14 PROCEDURE — 90471 IMMUNIZATION ADMIN: CPT

## 2019-03-05 ENCOUNTER — OFFICE VISIT (OUTPATIENT)
Dept: FAMILY MEDICINE | Facility: CLINIC | Age: 64
End: 2019-03-05
Payer: COMMERCIAL

## 2019-03-05 VITALS — OXYGEN SATURATION: 99 % | HEART RATE: 110 BPM

## 2019-03-05 DIAGNOSIS — D22.9 MELANOCYTIC NEVUS, UNSPECIFIED LOCATION: ICD-10-CM

## 2019-03-05 DIAGNOSIS — Z80.8 FAMILY HISTORY OF NONMELANOMA SKIN CANCER: ICD-10-CM

## 2019-03-05 DIAGNOSIS — Z87.2 HISTORY OF ACTINIC KERATOSES: Primary | ICD-10-CM

## 2019-03-05 DIAGNOSIS — L81.0 POST-INFLAMMATORY HYPERPIGMENTATION: ICD-10-CM

## 2019-03-05 DIAGNOSIS — L81.4 SOLAR LENTIGO: ICD-10-CM

## 2019-03-05 PROCEDURE — 99213 OFFICE O/P EST LOW 20 MIN: CPT | Performed by: FAMILY MEDICINE

## 2019-03-05 RX ORDER — LORAZEPAM 1 MG/1
1 TABLET ORAL
COMMUNITY
Start: 2018-09-13

## 2019-03-05 NOTE — LETTER
3/5/2019         RE: Jeniffer Meyer  [address] [place] [address]        Dear Colleague,    Thank you for referring your patient, Jeniffer Meyer, to [facility]. Please see a copy of my visit note below.    [facility] - PRIMARY CARE SKIN    CC: skin cancer screening (full-body)  SUBJECTIVE:   Jeniffer Meyer is a(n) 63 year old female who presents to clinic today for a full-body skin exam.    No bothersome lesions noticed by the patient or other skin concerns.    She recalls an injury to the right side of the face 2.5 weeks ago.    Personal Medical History  Skin cancer: NO - but history of actinic keratoses  Eczema Psoriasis Autoimmune   YES NO NO     Family Medical History  Skin cancer: YES - keratinocyte carcinoma(s) in father and brother  Eczema Psoriasis Autoimmune   NO NO NO     Sun Exposure History  Previous history of significant sun exposure:  Blistering sunburns: YES - when younger.  Tanning beds: YES - when younger.  Sunscreen usage: YES, frequency: daily.  UV-protective clothing usage: YES.    Occupation: [employer].    Refer to electronic medical record (EMR) for past medical history and medications.    INTEGUMENTARY/SKIN: NEGATIVE for worrisome rashes, moles or lesions  ROS: 14 point review of systems was negative except the symptoms listed above in the HPI.    This document serves as a record of the services and decisions personally performed and made by Jinny Ibrahim MD and was created by Rd Jones, a trained medical scribe, based on personal observations and provider statements to the medical scribe.  March 5, 2019 10:56 AM   Rd Jones    OBJECTIVE:   GENERAL: healthy, alert and no distress.  SKIN: Sparks Skin Type - III.  This patient was examined from the top of the head to the bottom of the feet  including scalp, face, neck, trunk, buttocks, both arms, both legs, both hands, both feet, and all fingers and toes. The dermatoscope was used  "to help evaluate pigmented lesions. Diffuse actinic damage to the skin.  Skin Pertinent Findings:  Upper extremities: Scattered brown, macule(s) most consistent with benign solar lentigo.  Scattered benign nevi on the arms, legs and back.  Anterior lower extremities: Multiple brown, macule(s) most consistent with benign solar lentigo.    Back: brown, macule(s) most consistent with benign solar lentigo.  Right lower leg- residual PIH from previous trama, some thinning of the skin.Suggested trial of Skin Medica scar revision gel.  Right malar eminence: 10 mm in size residual post-inflammatory hyperpigmentation     ASSESSMENT:     Encounter Diagnoses   Name Primary?     History of actinic keratoses Yes     Family history of nonmelanoma skin cancer      Solar lentigo        PLAN:   Patient Instructions   FUTURE APPOINTMENTS  Follow up in 1 year(s) for a full-body skin cancer screening.    SCAR MITIGATION   Gently massage the scar(s) once daily to loosen collagen.  You may consider use of SkinMedica Scar Recovery Gel once daily for six weeks.    SUN PROTECTION INSTRUCTIONS  Sun damage can lead to skin cancer and premature aging of the skin.      The best way to protect from sun damage to your skin is to avoid the sun during peak hours (10 am - 2 pm) even on overcast days.    Never use tanning beds. Tanning beds are associated with much higher risks of skin cancer.    All tanning damages the skin. Aim for ivory skin year round and you will have less trouble with your skin in years to come. There is no merit in getting \"a base tan\" before a warm weather vacation, as any tanning indicates your body's response to sun damage.    Stop smoking. Smokers have higher rates of skin cancer and also have premature skin wrinkling.    Use UPF sun-protective clothing, which while more expensive initially provides longer lasting coverage without having to worry about remembering to re-apply.  1. Wear a wide-brimmed hat and sunglasses. " "  2. Wear sun-protective clothing.  [employer] and other [employer] make sun protective clothing that are stylish, comfortable and cool.   Slinky and other companies make UV arm sleeves suitable for golfing, gardening and other activities.    Sunscreen instructions:  1. Use sunscreens with Zinc Oxide, Titanium Dioxide or Avobenzone to protect from UVA rays.  2. Use SPF 30-50+ to protect from UVB rays.  3. Re-apply every 2 hours even if water resistant.  4. Apply on your face every day even when cloudy and even in the winter. UVA \"aging rays\" penetrate window glass and are just as strong in the winter as in the summer.    FYI  You should use about 3 tablespoons of sunscreen to protect your whole body. Thus a typical eight ounce bottle of sunscreen should last 4 applications. Remember, that the SPF rating is compromised if you don t apply enough. Most people only apply 1/2 - 1/3 of the amount they need. Also don t forget areas such as your ears, feet, upper back and harder to reach places. Keep in mind that these amounts should be increased for larger body sizes.    Sunscreens with titanium dioxide and/or zinc oxide in the active ingredients are physical blockers as opposed to chemical blockers. Chemical-free sunscreens should not irritate the skin.    Spray-on sunscreens may be used for touch-up application only, not as a base layer. Also, use with caution around small children due to inhalation risk.    SPF means sun protection factor, which is just the degree to which the sunscreen can protect against UVB rays. There is no rating system for UVA rays. SPF is calculated as the time skin will burn when sunscreen is applied vs. skin without sunscreen.    Water resistant sunscreens should be re-applied every 1-2 hours.    Product Recommendations:    Consider use of sunscreen sticks with Zinc Oxide and Titanium Dioxide active ingredients such as Neutrogena Pure&Free Baby Sunscreen Stick.    Good examples " include: Blue Lizard, EltaMD, Solbar    Good daily moisturizers with SPF: Vanicream, CeraVe.    For sensitive skin, consider : SkinMedica Essential Defense Mineral Shield Broad Spectrum SPF 35    Men: consider use of Neutrogena Triple Protect Facial Lotion    Avoid retinyl palmitate products.  Avoid combination products that include both sunscreen and insect repellant, as sunscreen should be applied every 2 hours, but insect repellant should not be applied as frequently.    For more information:  https://www.skincancer.org/prevention/sun-protection/sunscreen/sunscreens-safe-and-effective      The patient was counseled about sunscreens and sun avoidance. The patient was counseled to check the skin regularly and report any lesion that is new, changing, itching, scabbing, bleeding or otherwise bothersome. The patient was discharged ambulatory and in stable condition.    TT: 25 minutes.  CT: 15 minutes.    The information in this document, created by the medical scribe for me, accurately reflects the services I personally performed and the decisions made by me. I have reviewed and approved this document for accuracy prior to leaving the patient care area.  March 5, 2019 10:56 AM  Jinny Ibrahim MD  [facility]    Again, thank you for allowing me to participate in the care of your patient.        Sincerely,        Jinny Ibrahim MD

## 2019-03-05 NOTE — PROGRESS NOTES
[facility] - PRIMARY CARE SKIN    CC: skin cancer screening (full-body)  SUBJECTIVE:   Jeniffer Meyer is a(n) 63 year old female who presents to clinic today for a full-body skin exam.    No bothersome lesions noticed by the patient or other skin concerns.    She recalls an injury to the right side of the face 2.5 weeks ago.    Personal Medical History  Skin cancer: NO - but history of actinic keratoses  Eczema Psoriasis Autoimmune   YES NO NO     Family Medical History  Skin cancer: YES - keratinocyte carcinoma(s) in father and brother  Eczema Psoriasis Autoimmune   NO NO NO     Sun Exposure History  Previous history of significant sun exposure:  Blistering sunburns: YES - when younger.  Tanning beds: YES - when younger.  Sunscreen usage: YES, frequency: daily.  UV-protective clothing usage: YES.    Occupation: [employer].    Refer to electronic medical record (EMR) for past medical history and medications.    INTEGUMENTARY/SKIN: NEGATIVE for worrisome rashes, moles or lesions  ROS: 14 point review of systems was negative except the symptoms listed above in the HPI.    This document serves as a record of the services and decisions personally performed and made by Jinny Ibrahim MD and was created by Rd Jones, a trained medical scribe, based on personal observations and provider statements to the medical scribe.  March 5, 2019 10:56 AM   Rd Jones    OBJECTIVE:   GENERAL: healthy, alert and no distress.  SKIN: Sparks Skin Type - III.  This patient was examined from the top of the head to the bottom of the feet  including scalp, face, neck, trunk, buttocks, both arms, both legs, both hands, both feet, and all fingers and toes. The dermatoscope was used to help evaluate pigmented lesions. Diffuse actinic damage to the skin.  Skin Pertinent Findings:  Upper extremities: Scattered brown, macule(s) most consistent with benign solar lentigo.  Scattered benign nevi on the arms, legs and  "back.  Anterior lower extremities: Multiple brown, macule(s) most consistent with benign solar lentigo.    Back: brown, macule(s) most consistent with benign solar lentigo.  Right lower leg- residual PIH from previous trama, some thinning of the skin.Suggested trial of Skin Medica scar revision gel.  Right malar eminence: 10 mm in size residual post-inflammatory hyperpigmentation     ASSESSMENT:     Encounter Diagnoses   Name Primary?     History of actinic keratoses Yes     Family history of nonmelanoma skin cancer      Solar lentigo        PLAN:   Patient Instructions   FUTURE APPOINTMENTS  Follow up in 1 year(s) for a full-body skin cancer screening.    SCAR MITIGATION   Gently massage the scar(s) once daily to loosen collagen.  You may consider use of SkinMedica Scar Recovery Gel once daily for six weeks.    SUN PROTECTION INSTRUCTIONS  Sun damage can lead to skin cancer and premature aging of the skin.      The best way to protect from sun damage to your skin is to avoid the sun during peak hours (10 am - 2 pm) even on overcast days.    Never use tanning beds. Tanning beds are associated with much higher risks of skin cancer.    All tanning damages the skin. Aim for ivory skin year round and you will have less trouble with your skin in years to come. There is no merit in getting \"a base tan\" before a warm weather vacation, as any tanning indicates your body's response to sun damage.    Stop smoking. Smokers have higher rates of skin cancer and also have premature skin wrinkling.    Use UPF sun-protective clothing, which while more expensive initially provides longer lasting coverage without having to worry about remembering to re-apply.  1. Wear a wide-brimmed hat and sunglasses.   2. Wear sun-protective clothing.  [employer] and other brands[employer]friends make sun protective clothing that are stylish, comfortable and cool.   EasyCopay and other brands4friends make UV arm sleeves suitable for golfing, gardening " "and other activities.    Sunscreen instructions:  1. Use sunscreens with Zinc Oxide, Titanium Dioxide or Avobenzone to protect from UVA rays.  2. Use SPF 30-50+ to protect from UVB rays.  3. Re-apply every 2 hours even if water resistant.  4. Apply on your face every day even when cloudy and even in the winter. UVA \"aging rays\" penetrate window glass and are just as strong in the winter as in the summer.    FYI  You should use about 3 tablespoons of sunscreen to protect your whole body. Thus a typical eight ounce bottle of sunscreen should last 4 applications. Remember, that the SPF rating is compromised if you don t apply enough. Most people only apply 1/2 - 1/3 of the amount they need. Also don t forget areas such as your ears, feet, upper back and harder to reach places. Keep in mind that these amounts should be increased for larger body sizes.    Sunscreens with titanium dioxide and/or zinc oxide in the active ingredients are physical blockers as opposed to chemical blockers. Chemical-free sunscreens should not irritate the skin.    Spray-on sunscreens may be used for touch-up application only, not as a base layer. Also, use with caution around small children due to inhalation risk.    SPF means sun protection factor, which is just the degree to which the sunscreen can protect against UVB rays. There is no rating system for UVA rays. SPF is calculated as the time skin will burn when sunscreen is applied vs. skin without sunscreen.    Water resistant sunscreens should be re-applied every 1-2 hours.    Product Recommendations:    Consider use of sunscreen sticks with Zinc Oxide and Titanium Dioxide active ingredients such as Neutrogena Pure&Free Baby Sunscreen Stick.    Good examples include: Blue Lizard, EltaMD, Solbar    Good daily moisturizers with SPF: Vanicream, CeraVe.    For sensitive skin, consider : SkinMedica Essential Defense Mineral Shield Broad Spectrum SPF 35    Men: consider use of Neutrogena Triple " Protect Facial Lotion    Avoid retinyl palmitate products.  Avoid combination products that include both sunscreen and insect repellant, as sunscreen should be applied every 2 hours, but insect repellant should not be applied as frequently.    For more information:  https://www.skincancer.org/prevention/sun-protection/sunscreen/sunscreens-safe-and-effective      The patient was counseled about sunscreens and sun avoidance. The patient was counseled to check the skin regularly and report any lesion that is new, changing, itching, scabbing, bleeding or otherwise bothersome. The patient was discharged ambulatory and in stable condition.    TT: 25 minutes.  CT: 15 minutes.    The information in this document, created by the medical scribe for me, accurately reflects the services I personally performed and the decisions made by me. I have reviewed and approved this document for accuracy prior to leaving the patient care area.  March 5, 2019 10:56 AM  Jinny Ibrahim MD  [facility]

## 2019-03-05 NOTE — PATIENT INSTRUCTIONS
"FUTURE APPOINTMENTS  Follow up in 1 year(s) for a full-body skin cancer screening.    SCAR MITIGATION   Gently massage the scar(s) once daily to loosen collagen.  You may consider use of SkinMedica Scar Recovery Gel once daily for six weeks.    SUN PROTECTION INSTRUCTIONS  Sun damage can lead to skin cancer and premature aging of the skin.      The best way to protect from sun damage to your skin is to avoid the sun during peak hours (10 am - 2 pm) even on overcast days.    Never use tanning beds. Tanning beds are associated with much higher risks of skin cancer.    All tanning damages the skin. Aim for ivory skin year round and you will have less trouble with your skin in years to come. There is no merit in getting \"a base tan\" before a warm weather vacation, as any tanning indicates your body's response to sun damage.    Stop smoking. Smokers have higher rates of skin cancer and also have premature skin wrinkling.    Use UPF sun-protective clothing, which while more expensive initially provides longer lasting coverage without having to worry about remembering to re-apply.  1. Wear a wide-brimmed hat and sunglasses.   2. Wear sun-protective clothing.  F[employer] (fuelpowered.com) and other [employer] make sun protective clothing that are stylish, comfortable and cool.   [employer]magic and other [employer] make UV arm sleeves suitable for golfing, gardening and other activities.    Sunscreen instructions:  1. Use sunscreens with Zinc Oxide, Titanium Dioxide or Avobenzone to protect from UVA rays.  2. Use SPF 30-50+ to protect from UVB rays.  3. Re-apply every 2 hours even if water resistant.  4. Apply on your face every day even when cloudy and even in the winter. UVA \"aging rays\" penetrate window glass and are just as strong in the winter as in the summer.    FYI  You should use about 3 tablespoons of sunscreen to protect your whole body. Thus a typical eight ounce bottle of sunscreen should last 4 applications. Remember, that " the SPF rating is compromised if you don t apply enough. Most people only apply 1/2 - 1/3 of the amount they need. Also don t forget areas such as your ears, feet, upper back and harder to reach places. Keep in mind that these amounts should be increased for larger body sizes.    Sunscreens with titanium dioxide and/or zinc oxide in the active ingredients are physical blockers as opposed to chemical blockers. Chemical-free sunscreens should not irritate the skin.    Spray-on sunscreens may be used for touch-up application only, not as a base layer. Also, use with caution around small children due to inhalation risk.    SPF means sun protection factor, which is just the degree to which the sunscreen can protect against UVB rays. There is no rating system for UVA rays. SPF is calculated as the time skin will burn when sunscreen is applied vs. skin without sunscreen.    Water resistant sunscreens should be re-applied every 1-2 hours.    Product Recommendations:    Consider use of sunscreen sticks with Zinc Oxide and Titanium Dioxide active ingredients such as Neutrogena Pure&Free Baby Sunscreen Stick.    Good examples include: Blue Lizard, EltaMD, Solbar    Good daily moisturizers with SPF: Vanicream, CeraVe.    For sensitive skin, consider : SkinMedica Essential Defense Mineral Shield Broad Spectrum SPF 35    Men: consider use of Neutrogena Triple Protect Facial Lotion    Avoid retinyl palmitate products.  Avoid combination products that include both sunscreen and insect repellant, as sunscreen should be applied every 2 hours, but insect repellant should not be applied as frequently.    For more information:  https://www.skincancer.org/prevention/sun-protection/sunscreen/sunscreens-safe-and-effective

## 2019-03-07 DIAGNOSIS — M81.0 OSTEOPOROSIS, SENILE: ICD-10-CM

## 2019-03-07 RX ORDER — ALENDRONATE SODIUM 70 MG/1
TABLET ORAL
Qty: 12 TABLET | Refills: 0 | Status: SHIPPED | OUTPATIENT
Start: 2019-03-07 | End: 2020-07-21

## 2019-03-07 NOTE — TELEPHONE ENCOUNTER
"Requested Prescriptions   Pending Prescriptions Disp Refills     alendronate (FOSAMAX) 70 MG tablet [Pharmacy Med Name: Alendronate Sodium Oral Tablet 70 MG] 12 tablet 0     Sig: TAKE 1 TABLET BY MOUTH EVERY 7 DAYS    Bisphosphonates Failed - 3/7/2019  2:36 PM       Failed - Normal serum creatinine on file within past 12 months    No lab results found.         Passed - Recent (12 mo) or future (30 days) visit within the authorizing provider's specialty    Patient had office visit in the last 12 months or has a visit in the next 30 days with authorizing provider or within the authorizing provider's specialty.  See \"Patient Info\" tab in inbasket, or \"Choose Columns\" in Meds & Orders section of the refill encounter.             Passed - Dexa on file within past 2 years    Please review last Dexa result.          Passed - Medication is active on med list       Passed - Patient is age 18 or older        Next 5 appointments (look out 90 days)    Apr 09, 2019 10:50 AM CDT  PHYSICAL with Terra Fraser MD  [facility] [address] [place] [id number]  [phone]      Prescription approved per [facility] Refill Protocol.    Vaishali Pena RN on 3/7/2019 at 3:22 PM    "

## 2019-04-09 ENCOUNTER — ANCILLARY PROCEDURE (OUTPATIENT)
Dept: MAMMOGRAPHY | Facility: CLINIC | Age: 64
End: 2019-04-09
Payer: COMMERCIAL

## 2019-04-09 ENCOUNTER — OFFICE VISIT (OUTPATIENT)
Dept: OBGYN | Facility: CLINIC | Age: 64
End: 2019-04-09
Payer: COMMERCIAL

## 2019-04-09 VITALS
WEIGHT: 135.8 LBS | BODY MASS INDEX: 21.31 KG/M2 | DIASTOLIC BLOOD PRESSURE: 88 MMHG | SYSTOLIC BLOOD PRESSURE: 152 MMHG | HEART RATE: 78 BPM | HEIGHT: 67 IN

## 2019-04-09 DIAGNOSIS — Z01.419 ENCOUNTER FOR GYNECOLOGICAL EXAMINATION WITHOUT ABNORMAL FINDING: Primary | ICD-10-CM

## 2019-04-09 DIAGNOSIS — M81.0 OSTEOPOROSIS, SENILE: ICD-10-CM

## 2019-04-09 DIAGNOSIS — Z12.31 VISIT FOR SCREENING MAMMOGRAM: ICD-10-CM

## 2019-04-09 PROBLEM — Z98.890 H/O COLONOSCOPY: Status: ACTIVE | Noted: 2017-09-29

## 2019-04-09 PROBLEM — D36.9 ADENOMATOUS POLYPS: Status: ACTIVE | Noted: 2019-04-09

## 2019-04-09 PROCEDURE — 77063 BREAST TOMOSYNTHESIS BI: CPT

## 2019-04-09 PROCEDURE — 99397 PER PM REEVAL EST PAT 65+ YR: CPT | Performed by: OBSTETRICS & GYNECOLOGY

## 2019-04-09 PROCEDURE — 77067 SCR MAMMO BI INCL CAD: CPT

## 2019-04-09 RX ORDER — LISINOPRIL 5 MG/1
5 TABLET ORAL 2 TIMES DAILY
Qty: 12 TABLET | Refills: 3 | Status: CANCELLED | OUTPATIENT
Start: 2019-04-09

## 2019-04-09 RX ORDER — LISINOPRIL 5 MG/1
10 TABLET ORAL 2 TIMES DAILY
COMMUNITY
Start: 2019-04-09

## 2019-04-09 ASSESSMENT — ANXIETY QUESTIONNAIRES
7. FEELING AFRAID AS IF SOMETHING AWFUL MIGHT HAPPEN: SEVERAL DAYS
1. FEELING NERVOUS, ANXIOUS, OR ON EDGE: SEVERAL DAYS
3. WORRYING TOO MUCH ABOUT DIFFERENT THINGS: SEVERAL DAYS
IF YOU CHECKED OFF ANY PROBLEMS ON THIS QUESTIONNAIRE, HOW DIFFICULT HAVE THESE PROBLEMS MADE IT FOR YOU TO DO YOUR WORK, TAKE CARE OF THINGS AT HOME, OR GET ALONG WITH OTHER PEOPLE: NOT DIFFICULT AT ALL
5. BEING SO RESTLESS THAT IT IS HARD TO SIT STILL: NOT AT ALL
2. NOT BEING ABLE TO STOP OR CONTROL WORRYING: NOT AT ALL
6. BECOMING EASILY ANNOYED OR IRRITABLE: SEVERAL DAYS
GAD7 TOTAL SCORE: 5

## 2019-04-09 ASSESSMENT — PATIENT HEALTH QUESTIONNAIRE - PHQ9
5. POOR APPETITE OR OVEREATING: SEVERAL DAYS
SUM OF ALL RESPONSES TO PHQ QUESTIONS 1-9: 0

## 2019-04-09 ASSESSMENT — MIFFLIN-ST. JEOR: SCORE: 1193.61

## 2019-04-09 NOTE — PATIENT INSTRUCTIONS
Follow up with your primary care provider for your other medical problems.  Continue self breast exam.  Increase physical activity and exercise.  Usual safety and preventative measures counseling done.  Last pap smear (2018) was normal.  No pap was obtained this year due to patient age per guidelines.  This was discussed with the patient and she agrees with the plan.  Discussed Osteoporosis screening as well as calcium and Vitamin D recommendations.  Will plan to repeat bone scan again next year due to osteoporosis.

## 2019-04-09 NOTE — PROGRESS NOTES
Jeniffer is a 63 year old  female who presents for annual exam.     Besides routine health maintenance, she has no other health concerns today .    Do you have a Health Care Directive?: Yes: Patient states has Advance Directive and will bring in a copy to clinic.    Fall risk:   Fallen 2 or more times in the past year?: No  Any fall with injury in the past year?: Yes    HPI:  Here today for yearly exam --doing well.  Postmenopausal.  No vb/spotting.  Denies any significant hot flushes or night sweats --maybe wakes up 1-2x/month wet from sweat overnight.  Not currently SA due to 's medical issues.  No vaginal dryness or pain with day to day life.  No bowel/bladder issues.   No leaking or incontinence issues.    ; works as  for [employer] (x45yrs!!!) --currently doing 7-8 longer trips per month ([place] or [place] in the winter and [place] in summer); spent some time in both [place] and [place] this winter; one grown daughter who I see  -staying active; working out with cardio/weights 4-5x/wk  +mammo today; +SBE --no issues; +implants  PCP -Jessica Jaffe MD --sees yearly; manages bloodwork, meds, etc  -hx osteoporosis; restarted on fosamax in  --bone scan last year slightly improved (spine -1.0, L hip -2.0, R hip -2.7) --will repeat next year  -had colonoscopy in  --will be due again next year due to polyp  -broke her wrist in August!  Slipped on wet steps --doing fine now; was out of work for ~2 months  - doing well (esophageal ca --having his last 6 month appt this year after 5yrs cancer free)    GYNECOLOGIC HISTORY:  No LMP recorded. Patient is postmenopausal..   reports that she has never smoked. She has never used smokeless tobacco.    Patient is not sexually active.  STD testing offered?  Declined  Last PHQ-9 score on record=   PHQ-9 SCORE 2019   PHQ-9 Total Score 0     Last GAD7 score on record=   MENA-7 SCORE 2017 4/10/2018 2019   Total Score 1 3 5     Alcohol  Score = 3    HEALTH MAINTENANCE:  Cholesterol: (No results found for: CHOL labs done today with pcp  Last Mammo: 4/10/18, Result: normal, Next Mammo: today   Pap: 4/10/18 neg, HPV-  DEXA: 4/10/18  Colonoscopy:  17, Result:  normal, Next Colonoscopy: due next year    Health maintenance updated:  yes    HISTORY:  OB History    Para Term  AB Living   1 1 1 0 0 0   SAB TAB Ectopic Multiple Live Births   0 0 0 0 0      # Outcome Date GA Lbr Van/2nd Weight Sex Delivery Anes PTL Lv   1 Term 93   2.325 kg (5 lb 2 oz) F  EPI        Name: Milvia     Patient Active Problem List   Diagnosis     Osteoporosis     Hypertension, essential     Family history of nonmelanoma skin cancer     History of actinic keratoses     Colon polyp     Adenomatous polyps     H/O colonoscopy     Past Surgical History:   Procedure Laterality Date     ENDOMETRIAL SAMPLING (BIOPSY)  2006    PMB-->path: benign     FOOT SURGERY       HAND SURGERY      thumb     HC ABLATION, ENDOMETRIAL, THERMAL, W/O HYSTEROSCOPIC GUIDANCE  2004    ([place])     MAMMOPLASTY AUGMENTATION Bilateral     saline implants      Social History     Tobacco Use     Smoking status: Never Smoker     Smokeless tobacco: Never Used   Substance Use Topics     Alcohol use: Yes     Alcohol/week: 0.0 oz      Problem (# of Occurrences) Relation (Name,Age of Onset)    Breast Cancer (2) Sister (52), Paternal Grandmother    Colon Cancer (1) Father    Diabetes (1) Mother    Fractures (1) Mother: Hip    Heart Disease (1) Brother    Hyperlipidemia (2) Father, Mother    Hypertension (2) Father, Mother    Osteoporosis (3) Mother, Maternal Grandmother, Maternal Grandfather            Current Outpatient Medications   Medication Sig     alendronate (FOSAMAX) 70 MG tablet TAKE 1 TABLET BY MOUTH EVERY 7 DAYS     amLODIPine (NORVASC) 5 MG tablet Take two tablets twice daily     Ascorbic Acid (VITAMIN C PO)      desonide (DESOWEN) 0.05 % cream APPLY SPARINGLY TO AFFECTED  "AREA TWO TIMES PER DAY FOR NO MORE THEN 3 -5 CONSECUTIVE DAYS     lisinopril (PRINIVIL/ZESTRIL) 5 MG tablet Take 5 mg by mouth 2 times daily      LORazepam (ATIVAN) 1 MG tablet Take 1 mg by mouth     MAGNESIUM OXIDE PO      Multiple Vitamins-Minerals (MULTIVITAMIN PO)      Wheat Dextrin (BENEFIBER PO)      No current facility-administered medications for this visit.        Allergies   Allergen Reactions     Codeine Nausea     Oxycodone-Acetaminophen      PN: LW Reaction: GI Upset       Past medical, surgical, social and family history were reviewed and updated in EPIC.    ROS:   12 point review of systems negative other than symptoms noted below.  Head: Nasal Congestion  Gastrointestinal: Heartburn  Genitourinary: Night Sweats, No Periods and Urgency  Neurologic: Dizziness  Musculoskeletal: Height Loss  Psychiatric: Anxiety    EXAM:  /88   Pulse 78   Ht 1.702 m (5' 7\")   Wt 61.6 kg (135 lb 12.8 oz)   BMI 21.27 kg/m     BMI: Body mass index is 21.27 kg/m .    EXAM:  Constitutional: Appearance: Well nourished, well developed alert, in no acute distress  Neck:  Lymph Nodes:  No lymphadenopathy present    Thyroid:  Gland size normal, nontender, no nodules or masses present  on palpation  Chest:  Respiratory Effort:  Breathing unlabored  Cardiovascular:Heart    Auscultation:  Regular rate, normal rhythm, no murmurs present  Breasts: Inspection of Breasts:  No lymphadenopathy present., Palpation of Breasts and Axillae:  No masses present on palpation, no breast tenderness., Axillary Lymph Nodes:  No lymphadenopathy present., No nodularity, asymmetry or nipple discharge bilaterally. and +IMPLANTS  Gastrointestinal:  Abdominal Examination:  Abdomen nontender to palpation, tone normal without     rigidity or guarding, no masses present, umbilicus without lesions    Liver and speen:  No hepatomegaly present, liver nontender to palpation    Hernias:  No hernias present  Lymphatic: Lymph Nodes:  No other " lymphadenopathy present  Skin:  General Inspection:  No rashes present, no lesions present, no areas of  discoloration.    Genitalia and Groin:  No rashes present, no lesions present, no areas of  discoloration, no masses present  Neurologic/Psychiatric:    Mental Status:  Oriented X3     Pelvic Exam:  External Genitalia:     Normal appearance for age, no discharge present, no tenderness present, no inflammatory lesions present, color normal  Vagina:     Normal vaginal vault without central or paravaginal defects, ATROPHIC  Bladder:     Nontender to palpation  Urethra:   Urethral Body:  Urethra palpation normal, urethra structural support normal   Urethral Meatus:  No erythema or lesions present  Cervix:     Appearance healthy, no lesions present, nontender to palpation, no bleeding present  Uterus:     Nontender to palpation, no masses present, position anteflexed, mobility: normal  Adnexa:     No adnexal tenderness present, no adnexal masses present  Perineum:     Perineum within normal limits, no evidence of trauma, no rashes or skin lesions present  Inguinal Lymph Nodes:     No lymphadenopathy present      COUNSELING:   Reviewed preventive health counseling, as reflected in patient instructions  Special attention given to:       Regular exercise       Healthy diet/nutrition       Osteoporosis Prevention/Bone Health       Colon cancer screening    BMI:  Body mass index is 21.27 kg/m .     reports that she has never smoked. She has never used smokeless tobacco.      ASSESSMENT:  63 year old female with satisfactory annual exam.    ICD-10-CM    1. Encounter for gynecological examination without abnormal finding Z01.419    2. Osteoporosis, senile M81.0        PLAN:  Patient Instructions   Follow up with your primary care provider for your other medical problems.  Continue self breast exam.  Increase physical activity and exercise.  Usual safety and preventative measures counseling done.  Last pap smear (2018) was  normal.  No pap was obtained this year due to patient age per guidelines.  This was discussed with the patient and she agrees with the plan.  Discussed Osteoporosis screening as well as calcium and Vitamin D recommendations.  Will plan to repeat bone scan again next year due to osteoporosis.        Terra Fraser MD

## 2019-04-10 ASSESSMENT — ANXIETY QUESTIONNAIRES: GAD7 TOTAL SCORE: 5

## 2019-04-17 ENCOUNTER — TELEPHONE (OUTPATIENT)
Dept: OBGYN | Facility: CLINIC | Age: 64
End: 2019-04-17

## 2019-04-17 DIAGNOSIS — M81.0 OSTEOPOROSIS WITHOUT CURRENT PATHOLOGICAL FRACTURE, UNSPECIFIED OSTEOPOROSIS TYPE: Primary | ICD-10-CM

## 2019-04-17 NOTE — TELEPHONE ENCOUNTER
Patient saw Dr. Fraser 4/9, she needs a refill on Fosamax.  Please send to [facility] pharmacy.

## 2019-04-18 RX ORDER — ALENDRONATE SODIUM 70 MG/1
70 TABLET ORAL
Qty: 12 TABLET | Refills: 3 | Status: SHIPPED | OUTPATIENT
Start: 2019-04-18 | End: 2020-03-17

## 2019-04-18 NOTE — TELEPHONE ENCOUNTER
Pt calling- needs new rx for her fosamax- was not renewed at her annual visit.      Annual exam 4/9/19 Dr JORGE  -hx osteoporosis; restarted on fosamax in 2015 --bone scan last year slightly improved (spine -1.0, L hip -2.0, R hip -2.7) --will repeat next year      Fosamax 70mg   Pharmacy- [facility]    Will route to Dr Fraser for approval  Med is pended

## 2020-03-14 DIAGNOSIS — M81.0 OSTEOPOROSIS WITHOUT CURRENT PATHOLOGICAL FRACTURE, UNSPECIFIED OSTEOPOROSIS TYPE: ICD-10-CM

## 2020-03-16 NOTE — TELEPHONE ENCOUNTER
"Requested Prescriptions   Pending Prescriptions Disp Refills     alendronate (FOSAMAX) 70 MG tablet [Pharmacy Med Name: ALENDRONATE SODIUM 70 MG Tablet] 12 tablet 3     Sig: TAKE 1 TABLET EVERY 7 DAYS       Bisphosphonates Failed - 3/14/2020 12:46 PM        Failed - Normal serum creatinine on file within past 12 months     No lab results found.    Ok to refill medication if creatinine is low          Passed - Recent (12 mo) or future (30 days) visit within the authorizing provider's specialty     Patient has had an office visit with the authorizing provider or a provider within the authorizing providers department within the previous 12 mos or has a future within next 30 days. See \"Patient Info\" tab in inbasket, or \"Choose Columns\" in Meds & Orders section of the refill encounter.              Passed - Dexa on file within past 2 years     Please review last Dexa result.           Passed - Medication is active on med list        Passed - Patient is age 18 or older           Last Written Prescription Date:  4/18/19  Last Fill Quantity: 12,  # refills: 3   Last office visit: 4/9/2019 with prescribing provider:  DR Evelio fall [facility]   Future Office Visit:   Next 5 appointments (look out 90 days)    Apr 15, 2020  9:30 AM CDT  PHYSICAL with Terra Fraser MD  [facility] ([facility]) [address] [place] [id number] [phone]       Prescription approved per [facility] Refill Protocol.  Vaishali Pena RN on 3/17/2020 at 10:13 AM      "

## 2020-03-17 RX ORDER — ALENDRONATE SODIUM 70 MG/1
TABLET ORAL
Qty: 12 TABLET | Refills: 0 | Status: SHIPPED | OUTPATIENT
Start: 2020-03-17 | End: 2020-04-08

## 2020-03-31 DIAGNOSIS — M81.0 OSTEOPOROSIS WITHOUT CURRENT PATHOLOGICAL FRACTURE, UNSPECIFIED OSTEOPOROSIS TYPE: ICD-10-CM

## 2020-03-31 DIAGNOSIS — M81.0 OSTEOPOROSIS, SENILE: ICD-10-CM

## 2020-03-31 RX ORDER — ALENDRONATE SODIUM 70 MG/1
TABLET ORAL
Qty: 12 TABLET | Refills: 0 | OUTPATIENT
Start: 2020-03-31

## 2020-03-31 NOTE — TELEPHONE ENCOUNTER
"Requested Prescriptions   Pending Prescriptions Disp Refills     alendronate (FOSAMAX) 70 MG tablet 12 tablet 0       Bisphosphonates Failed - 3/31/2020  9:18 AM        Failed - Normal serum creatinine on file within past 12 months     No lab results found.    Ok to refill medication if creatinine is low          Passed - Recent (12 mo) or future (30 days) visit within the authorizing provider's specialty     Patient has had an office visit with the authorizing provider or a provider within the authorizing providers department within the previous 12 mos or has a future within next 30 days. See \"Patient Info\" tab in inbasket, or \"Choose Columns\" in Meds & Orders section of the refill encounter.              Passed - Dexa on file within past 2 years     Please review last Dexa result.           Passed - Medication is active on med list        Passed - Patient is age 18 or older           Last Written Prescription Date:  3/17/20  Last Fill Quantity: 12,  # refills: 0   Last office visit: 4/9/2019 with prescribing provider:  Dr Terra Fraser   Future Office Visit:  none  Refilled on 3/17  Vaishali Pena RN on 3/31/2020 at 9:26 AM    "

## 2020-04-08 RX ORDER — ALENDRONATE SODIUM 70 MG/1
TABLET ORAL
Qty: 12 TABLET | Refills: 0 | Status: SHIPPED | OUTPATIENT
Start: 2020-04-08 | End: 2020-07-21

## 2020-04-08 NOTE — TELEPHONE ENCOUNTER
Patient calling regarding refill denial. Being that her annual was postponed, can she do a lab only?

## 2020-04-08 NOTE — TELEPHONE ENCOUNTER
A prescription for resent as patient had cancelled the previous one thinking that she would be okay with the bone scan coming up.  A prescription for  Maya Marley RN on 4/8/2020 at 4:13 PM

## 2020-07-21 ENCOUNTER — ANCILLARY PROCEDURE (OUTPATIENT)
Dept: BONE DENSITY | Facility: CLINIC | Age: 65
End: 2020-07-21
Attending: OBSTETRICS & GYNECOLOGY
Payer: COMMERCIAL

## 2020-07-21 ENCOUNTER — ANCILLARY PROCEDURE (OUTPATIENT)
Dept: MAMMOGRAPHY | Facility: CLINIC | Age: 65
End: 2020-07-21
Attending: OBSTETRICS & GYNECOLOGY
Payer: COMMERCIAL

## 2020-07-21 ENCOUNTER — OFFICE VISIT (OUTPATIENT)
Dept: OBGYN | Facility: CLINIC | Age: 65
End: 2020-07-21
Attending: OBSTETRICS & GYNECOLOGY
Payer: COMMERCIAL

## 2020-07-21 ENCOUNTER — TELEPHONE (OUTPATIENT)
Dept: FAMILY MEDICINE | Facility: CLINIC | Age: 65
End: 2020-07-21

## 2020-07-21 VITALS
SYSTOLIC BLOOD PRESSURE: 156 MMHG | HEART RATE: 64 BPM | BODY MASS INDEX: 21.19 KG/M2 | DIASTOLIC BLOOD PRESSURE: 90 MMHG | HEIGHT: 67 IN | WEIGHT: 135 LBS

## 2020-07-21 DIAGNOSIS — M81.0 OSTEOPOROSIS, SENILE: ICD-10-CM

## 2020-07-21 DIAGNOSIS — Z12.11 SCREEN FOR COLON CANCER: ICD-10-CM

## 2020-07-21 DIAGNOSIS — Z78.0 ASYMPTOMATIC POSTMENOPAUSAL STATE: ICD-10-CM

## 2020-07-21 DIAGNOSIS — Z01.419 ENCOUNTER FOR GYNECOLOGICAL EXAMINATION WITHOUT ABNORMAL FINDING: Primary | ICD-10-CM

## 2020-07-21 DIAGNOSIS — M81.0 OSTEOPOROSIS WITHOUT CURRENT PATHOLOGICAL FRACTURE, UNSPECIFIED OSTEOPOROSIS TYPE: ICD-10-CM

## 2020-07-21 DIAGNOSIS — Z12.31 VISIT FOR SCREENING MAMMOGRAM: ICD-10-CM

## 2020-07-21 PROCEDURE — 77080 DXA BONE DENSITY AXIAL: CPT | Performed by: OBSTETRICS & GYNECOLOGY

## 2020-07-21 PROCEDURE — 77063 BREAST TOMOSYNTHESIS BI: CPT | Mod: TC

## 2020-07-21 PROCEDURE — 99397 PER PM REEVAL EST PAT 65+ YR: CPT | Performed by: OBSTETRICS & GYNECOLOGY

## 2020-07-21 PROCEDURE — 77067 SCR MAMMO BI INCL CAD: CPT | Mod: TC

## 2020-07-21 RX ORDER — ALENDRONATE SODIUM 70 MG/1
TABLET ORAL
Qty: 12 TABLET | Refills: 3 | Status: SHIPPED | OUTPATIENT
Start: 2020-07-21 | End: 2021-07-23

## 2020-07-21 ASSESSMENT — ANXIETY QUESTIONNAIRES
GAD7 TOTAL SCORE: 5
7. FEELING AFRAID AS IF SOMETHING AWFUL MIGHT HAPPEN: SEVERAL DAYS
3. WORRYING TOO MUCH ABOUT DIFFERENT THINGS: SEVERAL DAYS
2. NOT BEING ABLE TO STOP OR CONTROL WORRYING: SEVERAL DAYS
5. BEING SO RESTLESS THAT IT IS HARD TO SIT STILL: NOT AT ALL
6. BECOMING EASILY ANNOYED OR IRRITABLE: SEVERAL DAYS
1. FEELING NERVOUS, ANXIOUS, OR ON EDGE: SEVERAL DAYS
IF YOU CHECKED OFF ANY PROBLEMS ON THIS QUESTIONNAIRE, HOW DIFFICULT HAVE THESE PROBLEMS MADE IT FOR YOU TO DO YOUR WORK, TAKE CARE OF THINGS AT HOME, OR GET ALONG WITH OTHER PEOPLE: NOT DIFFICULT AT ALL

## 2020-07-21 ASSESSMENT — PATIENT HEALTH QUESTIONNAIRE - PHQ9
5. POOR APPETITE OR OVEREATING: NOT AT ALL
SUM OF ALL RESPONSES TO PHQ QUESTIONS 1-9: 1

## 2020-07-21 ASSESSMENT — MIFFLIN-ST. JEOR: SCORE: 1179.99

## 2020-07-21 NOTE — PROGRESS NOTES
Jeniffer is a 65 year old  female who presents for annual exam.     Besides routine health maintenance, she has no other health concerns today .    Do you have a Health Care Directive?: Yes, advance care planning is on file.    Fall risk:   Fall Risk Assessment completed per order.    HPI:  Here today for yearly exam --doing well.  Postmenopausal.  No vb/spotting.  Rare night sweats --not an issue.  Denies vaginal dryness or pain.  Not currently SA due to 's medical issues.  No bowel/bladder issues.  No leaking or incontinence problems.    ; will retire !!!  Has not flown since  --offered great package to furlough but excited to retire  -staying active; has become big walker --walking daily; also doing home yoga, has been back to [employer] and taking precautions  +mammo today; +SBE on occ --no issues  PCP -Chip Jaffe --recently had virtual visit with new provider who she will meet next week; changed blood pressure meds this spring  -had bone scan today; hx osteoporosis --has been on fosamax since  --numbers stable today with spine -0.9 (-1.0 in 2018), L hip -2.1 (-2.0) and R hip -2.7 (-2.7); will continue fosamax for 2 more years and repeat bone scan --if worsened or stable, may consider med change or rheum referral  -due for colonoscopy this fall (polyps in 2017, +fam hx)    GYNECOLOGIC HISTORY:  No LMP recorded. Patient is postmenopausal..   reports that she has never smoked. She has never used smokeless tobacco.    Patient is not sexually active.  STD testing offered?  Declined  Last PHQ-9 score on record=   PHQ-9 SCORE 2020   PHQ-9 Total Score 1     Last GAD7 score on record=   MENA-7 SCORE 4/10/2018 2019 2020   Total Score 3 5 5     Alcohol Score = 4    HEALTH MAINTENANCE:  Cholesterol: (No results found for: CHOL   Last Mammo: One year ago, Result: Normal, Next Mammo: Today   Pap:   Lab Results   Component Value Date    PAP NIL 04/10/2018     4/10/18 WNL HPV (-)neg  DEXA:  2018. today  Colonoscopy:  17, Result:  polyp, Next Colonoscopy: due this years.    Health maintenance updated:  yes    HISTORY:  OB History    Para Term  AB Living   1 1 1 0 0 1   SAB TAB Ectopic Multiple Live Births   0 0 0 0 1      # Outcome Date GA Lbr Van/2nd Weight Sex Delivery Anes PTL Lv   1 Term 93   2.325 kg (5 lb 2 oz) F  EPI  IVAN      Name: Milvia     Patient Active Problem List   Diagnosis     Osteoporosis     Hypertension, essential     Family history of nonmelanoma skin cancer     History of actinic keratoses     Colon polyp     Adenomatous polyps     H/O colonoscopy     Past Surgical History:   Procedure Laterality Date     ENDOMETRIAL SAMPLING (BIOPSY)  2006    PMB-->path: benign     FOOT SURGERY       HAND SURGERY      thumb     HC ABLATION, ENDOMETRIAL, THERMAL, W/O HYSTEROSCOPIC GUIDANCE  2004    ([place])     MAMMOPLASTY AUGMENTATION Bilateral     saline implants      Social History     Tobacco Use     Smoking status: Never Smoker     Smokeless tobacco: Never Used   Substance Use Topics     Alcohol use: Yes     Alcohol/week: 0.0 standard drinks      Problem (# of Occurrences) Relation (Name,Age of Onset)    Breast Cancer (2) Sister (52), Paternal Grandmother    Colon Cancer (1) Father    Diabetes (1) Mother    Fractures (1) Mother: Hip    Heart Disease (1) Brother    Hyperlipidemia (2) Father, Mother    Hypertension (2) Father, Mother    Osteoporosis (3) Mother, Maternal Grandmother, Maternal Grandfather            Current Outpatient Medications   Medication Sig     alendronate (FOSAMAX) 70 MG tablet TAKE 1 TABLET EVERY 7 DAYS     Ascorbic Acid (VITAMIN C PO)      desonide (DESOWEN) 0.05 % cream APPLY SPARINGLY TO AFFECTED AREA TWO TIMES PER DAY FOR NO MORE THEN 3 -5 CONSECUTIVE DAYS     lisinopril (PRINIVIL/ZESTRIL) 5 MG tablet Take 10 mg by mouth 2 times daily Pt takes 15mg daily, 2 tabs in the morning and one at night      "LORazepam (ATIVAN) 1 MG tablet Take 1 mg by mouth     MAGNESIUM OXIDE PO      Multiple Vitamins-Minerals (MULTIVITAMIN PO)      Wheat Dextrin (BENEFIBER PO)      No current facility-administered medications for this visit.        Allergies   Allergen Reactions     Codeine Nausea     Oxycodone-Acetaminophen      PN: LW Reaction: GI Upset       Past medical, surgical, social and family history were reviewed and updated in EPIC.    ROS:   12 point review of systems negative other than symptoms noted below or in the HPI.  No urinary frequency or dysuria, bladder or kidney problems    EXAM:  BP (!) 156/90   Pulse 64   Ht 1.702 m (5' 7\")   Wt 61.2 kg (135 lb)   Breastfeeding No   BMI 21.14 kg/m     BMI: Body mass index is 21.14 kg/m .    EXAM:  Constitutional: Appearance: Well nourished, well developed alert, in no acute distress  Neck:  Lymph Nodes:  No lymphadenopathy present    Thyroid:  Gland size normal, nontender, no nodules or masses present  on palpation  Chest:  Respiratory Effort:  Breathing unlabored  Cardiovascular:Heart    Auscultation:  Regular rate, normal rhythm, no murmurs present  Breasts: Inspection of Breasts:  No lymphadenopathy present., Palpation of Breasts and Axillae:  No masses present on palpation, no breast tenderness., Axillary Lymph Nodes:  No lymphadenopathy present. and No nodularity, asymmetry or nipple discharge bilaterally. +IMPLANTS  Gastrointestinal:  Abdominal Examination:  Abdomen nontender to palpation, tone normal without     rigidity or guarding, no masses present, umbilicus without lesions    Liver and speen:  No hepatomegaly present, liver nontender to palpation    Hernias:  No hernias present  Lymphatic: Lymph Nodes:  No other lymphadenopathy present  Skin:  General Inspection:  No rashes present, no lesions present, no areas of  discoloration.    Genitalia and Groin:  No rashes present, no lesions present, no areas of  discoloration, no masses " present  Neurologic/Psychiatric:    Mental Status:  Oriented X3     Pelvic Exam:  External Genitalia:     Normal appearance for age, no discharge present, no tenderness present, no inflammatory lesions present, color normal  Vagina:     Normal vaginal vault without central or paravaginal defects, no discharge present, no inflammatory lesions present, no masses present  Bladder:     Nontender to palpation  Urethra:   Urethral Body:  Urethra palpation normal, urethra structural support normal   Urethral Meatus:  No erythema or lesions present  Cervix:     Appearance healthy, no lesions present, nontender to palpation, no bleeding present  Uterus:     Uterus: firm, normal sized and nontender, anteverted in position.   Adnexa:     No adnexal tenderness present, no adnexal masses present  Perineum:     Perineum within normal limits, no evidence of trauma, no rashes or skin lesions present  Anus:     Anus within normal limits, no hemorrhoids present  Inguinal Lymph Nodes:     No lymphadenopathy present  Pubic Hair:     Normal pubic hair distribution for age  Genitalia and Groin:     No rashes present, no lesions present, no areas of discoloration, no masses present      COUNSELING:   Reviewed preventive health counseling, as reflected in patient instructions  Special attention given to:       Regular exercise       Healthy diet/nutrition       Osteoporosis Prevention/Bone Health       Colon cancer screening    BMI:  Body mass index is 21.14 kg/m .  Weight management plan noted, stable and monitoring   reports that she has never smoked. She has never used smokeless tobacco.      ASSESSMENT:  65 year old female with satisfactory annual exam.    ICD-10-CM    1. Encounter for gynecological examination without abnormal finding  Z01.419    2. Screen for colon cancer  Z12.11 GASTROENTEROLOGY ADULT REF PROCEDURE ONLY   3. Osteoporosis, senile  M81.0    4. Osteoporosis without current pathological fracture, unspecified  osteoporosis type  M81.0 alendronate (FOSAMAX) 70 MG tablet       PLAN:  Patient Instructions   Follow up with your primary care provider for your other medical problems.  Continue self breast exam.  Increase physical activity and exercise.  Lab results will be called to the patient.  Usual safety and preventative measures counseling done.  Discussed Osteoporosis screening as well as calcium and Vitamin D recommendations.  Preliminary results discussed today and will send official result letter later this week.  Will continue on daily fosamax at this time and repeat bone scan in 2yrs.  Last pap smear (2018) was normal.  No pap was obtained this year due to patient age per guidelines.  This was discussed with the patient and she agrees with the plan.      Terra Fraser MD

## 2020-07-21 NOTE — PATIENT INSTRUCTIONS
Follow up with your primary care provider for your other medical problems.  Continue self breast exam.  Increase physical activity and exercise.  Lab results will be called to the patient.  Usual safety and preventative measures counseling done.  Discussed Osteoporosis screening as well as calcium and Vitamin D recommendations.  Preliminary results discussed today and will send official result letter later this week.  Will continue on daily fosamax at this time and repeat bone scan in 2yrs.  Last pap smear (2018) was normal.  No pap was obtained this year due to patient age per guidelines.  This was discussed with the patient and she agrees with the plan.

## 2020-07-21 NOTE — LETTER
[address] [address]  Phone: [phone]  Fax: [phone]      Jeniffer Meyer     Date:  7/21/2020   [address] [place] [address]      Thank you for having your DEXA scan performed.  As you recall, the DEXA scan evaluates the strength of your bones.  It is important to know the strength of your bones to help you avoid breaking bones in the future.    A T score shows your risk for breaking a bone.  Normal bone has a T score of -0.9 or higher.  Some bone thinning (osteopenia) has a T score between -1.0 and -2.4.  A lot of bone thinning (osteoporosis) has a T score of -2.5 or lower.    Your T scores on 7/21/2020 were:     Left Hip:  -2.1 score    Right Hip:  -2.7 score    This T score shows you have some thinning (osteopenia) on the left and much thinning (osteoporosis) on the left  Compared with your previous scan, this shows no major change.    Spine:  -0.9 score      This T score shows you have normal bone Compared with your previous scan, this shows no major change.      The good news is that there are treatments for making your bones stronger, if you need them.    Ways you can make your bones stronger:    Weight bearing exercises such as walking.  Eat three servings of dairy a day, or take calcium each day (0687-8149 mg).  Take Vitamin D each day (1,000-2,000 IU).    For more information, please make an appointment with your Primary Care Provider, request a brochure from our office, or look on our website and go to the Bone Density page.     No need to make an appointment.    Jeniffer, continue Vitamin D, Calcium and exercise.    Overall your bone density is stable over the last few years with your fosamax.  Although I would love to see improvement, I also consider the stability a success.  We will continue your fosamax and plan to repeat your bone scan again in 2 weeks.    Thank you.    Terra Fraser  MD

## 2020-07-21 NOTE — TELEPHONE ENCOUNTER
Patient is calling back requesting to schedule a full body skin check. Please call patient back at [phone] okay to leave a detailed vm.    .Anika DO    [facility] [place]

## 2020-07-22 ASSESSMENT — ANXIETY QUESTIONNAIRES: GAD7 TOTAL SCORE: 5

## 2020-08-26 ENCOUNTER — OFFICE VISIT (OUTPATIENT)
Dept: FAMILY MEDICINE | Facility: CLINIC | Age: 65
End: 2020-08-26
Payer: COMMERCIAL

## 2020-08-26 DIAGNOSIS — L81.4 SOLAR LENTIGINOSIS: ICD-10-CM

## 2020-08-26 DIAGNOSIS — L57.0 ACTINIC KERATOSIS: ICD-10-CM

## 2020-08-26 DIAGNOSIS — D48.5 NEOPLASM OF UNCERTAIN BEHAVIOR OF SKIN: Primary | ICD-10-CM

## 2020-08-26 DIAGNOSIS — D22.5 MELANOCYTIC NEVI OF TRUNK: ICD-10-CM

## 2020-08-26 PROCEDURE — 88305 TISSUE EXAM BY PATHOLOGIST: CPT | Mod: TC | Performed by: FAMILY MEDICINE

## 2020-08-26 PROCEDURE — 11306 SHAVE SKIN LESION 0.6-1.0 CM: CPT | Performed by: FAMILY MEDICINE

## 2020-08-26 PROCEDURE — 99213 OFFICE O/P EST LOW 20 MIN: CPT | Mod: 25 | Performed by: FAMILY MEDICINE

## 2020-08-26 PROCEDURE — 17000 DESTRUCT PREMALG LESION: CPT | Mod: 59 | Performed by: FAMILY MEDICINE

## 2020-08-26 RX ORDER — ATORVASTATIN CALCIUM 10 MG/1
10 TABLET, FILM COATED ORAL
COMMUNITY
Start: 2020-08-04

## 2020-08-26 NOTE — PROGRESS NOTES
[facility] - PRIMARY CARE SKIN    CC: skin cancer screening (full-body)  SUBJECTIVE:   Jeniffer Meyer is a(n) 65 year old female who presents to clinic today for a full-body skin exam.    Issue one  : two brown spots on the left lateral hip    Personal Medical History  Skin cancer: NO - but history of actinic keratoses  Eczema Psoriasis Autoimmune   YES NO NO     Family Medical History  Skin cancer: YES - keratinocyte carcinoma(s) in father and brother  Eczema Psoriasis Autoimmune   NO NO NO     Sun Exposure History  Previous history of significant sun exposure:  Blistering sunburns: YES - when younger.  Tanning beds: YES - when younger.  Sunscreen usage: YES, frequency: daily.  UV-protective clothing usage: YES.    Occupation: flight stewardess- retired    Refer to electronic medical record (EMR) for past medical history and medications.    INTEGUMENTARY/SKIN: NEGATIVE for worrisome rashes, moles or lesions  ROS: 14 point review of systems was negative except the symptoms listed above in the HPI.        OBJECTIVE:   GENERAL: healthy, alert and no distress.  SKIN: Sparks Skin Type - III.  This patient was examined from the top of the head to the bottom of the feet  including scalp, face, neck, trunk, buttocks, both arms, both legs, both hands, both feet, and all fingers and toes. The dermatoscope was used to help evaluate pigmented lesions. Diffuse actinic damage to the skin.  Skin Pertinent Findings:    Right dorsal foot - flat, slightly gritty erythematous lesion, 8 mm ? Actinic keratosis ? Superficial basal cell carcinoma ? Seborrheic keratosis ?Other            Left mid upper lip-   Erythematous, scaly, non-indurated lesion(s) most consistent with actinic keratosis   Name: Liquid nitrogen [facility]- cryotherapy  Indication: Pre-malignant actinic keratosis  Completed by: Karen Ibrahim MD.  Note : Discussed natural history of lesion and treatment options. Prior to treatment, we discussed inflammation,  tenderness post-procedure, the healing process, and the risks of pain, infection, scarring, blistering, and hypo-/hyperpigmentation after healing. Explained that these lesions may grow back and may need additional treatment or re-evaluation. The patient understood and verbally agreed to proceed with cryotherapy.    Each actinic keratosis was treated using liquid nitrogen Cry-Ac with a two five second bursts with a pause to allow for the area to thaw.    The patient tolerated the procedure well and left in good condition. If this lesion should persist or recur, then it needs to be re-evaluated.  Total number of lesions treated: 1  Trunk, arms, legs, :           Multiple rown, macule(s) most consistent with benign solar lentigo          Raised, coarse textured, stuck appearing lesion consistent with seborrheic keratosis           Brown macules of various sizes and shapes most consistent with (benign) melanocytic nevi                  ASSESSMENT:     Encounter Diagnoses   Name Primary?     Neoplasm of uncertain behavior of skin Yes     Actinic keratosis      Solar lentiginosis      Melanocytic nevi of trunk        PLAN:   Patient Instructions   FUTURE APPOINTMENTS  Follow up per pathology report. You will be notified, generally via letter or MyChart, in approximately 10 days. If there is anything we need to discuss or further treatment needed, I will call you to discuss it.    Vaseline and a bandage for 5-7 days.     Skin exam once a year     Can shower normally     WOUND CARE INSTRUCTIONS  1. Wash hands before every dressing change.  2. Change the dressing after 24 hours and once daily, or earlier if it becomes saturated.  3. Wash the wound area with a mild soap, then rinse.  4. Gently pat dry with a sterile gauze or Q-tip.  5. Using a Q-tip, apply Vaseline or Aquaphor only over entire wound. DO NOT use Neosporin - as many people react to neomycin.  6. Finally, cover with a bandage or sterile non-stick gauze with  "micropore paper tape.  7. Repeat once daily until wound has healed.      Soap, water and shampoo will not hurt this area.    Do not go swimming or take baths, but showering is encouraged.    Limit use of the area where the procedure was done for a few days to allow for optimal healing.    Signs of Infection:  Infection can occur in any area where skin has been disrupted. If you notice persistent redness, swelling, colored drainage, increasing pain, fever or other signs of infection, please call us at: [phone] and ask to have me or my colleague paged. We will call you back to discuss.    If you experience bleeding:  Wash hands and hold firm pressure on the area for 10 minutes without checking to see if the bleeding has stopped. \"Checking\" pulls off the protective wound clot and restarts the bleeding all over again. Re-apply pressure for 10 minutes if necessary to stop bleeding.  Use additional sterile gauze and tape to maintain pressure once bleeding has stopped.  If bleeding continues, then call back to clinic at [phone].    PATIENT INFORMATION : WOUNDS  During the healing process you will notice a number of changes.     All wounds normally drain.  The larger the wound the more drainage there will be.  After 7-10 days, you will notice the wound beginning to shrink and new skin will begin to grow.  The wound is healed when you can see that skin has formed over the entire area.  A healed wound has a healthy, shiny look to the surface and is red to dark pink in color to normalize.  Wounds may take approximately 4-6 weeks to heal.  Larger wounds may take 6-8 weeks. After the wound is healed you may discontinue dressing changes.    All wounds develop a small halo of redness surrounding the wound which means that healing is occurring. Severe itching with extensive redness usually indicates sensitivity to the ointment or bandage tape used to dress the wound.  You should call our office if severe itching with " extensive redness develops.    Swelling  and/or discoloration around your surgical site is common, particularly when performed around the eye.  You may experience a sensation of tightness as your wound heals. This is normal and will gradually subside.  Your healed wound may be sensitive to temperature changes. This sensitivity improves with time, but if you re having a lot of discomfort, try to avoid temperature extremes.  Patients frequently experience itching after their wound appears to have healed because of the continue healing under the skin.  Plain Vaseline will help relieve the itching.    ACTINIC KERATOSES POST-TREATMENT CARE INSTRUCTIONS  Actinic keratoses are benign, scaly or gritty lesions that appear in sun-exposed areas and may progress to skin cancers. For this reason, it is important to treat them before they become cancerous. Liquid nitrogen is the most commonly used and most effective treatment for actinic keratoses; it is mildly uncomfortable when applied to the skin, but the discomfort rapidly subsides.    Post-Treatment:  You may experience burning and/or stinging immediately following the procedure. The discomfort from the procedure may persist over the next 12-24 hours. The area treated will look pinker and slightly swollen before the healing process begins. You may also notice redness, swelling, tenderness, weeping and crusts or scabs. Healing time is approximately 10-14 days.    Blister - You may or may notice blistering from the freezing. If you develop an uncomfortable blister from today's treatment, you may gently puncture this with a needle that has been cleaned with alcohol. However, do not remove the protective skin layer of the blister.    Scab - After a few days, you may notice scaliness or scab formation. Do not pick at the scabs because this may cause slower healing and a permanent scar.    The skin may appear temporarily darker at the treatment site, but this usually fades over a  period of months, provided that the area is protected from the sun.    Care of the areas treated:    Wash the area with a mild cleanser.    Gently pat dry.    Do not rub.     Keep protected from the sun during the healing process and for a full year following treatment as the skin continues to remodel during this time.    Apply Vaseline or Aquaphor ointment sparingly to the site for the first 7 days after treatment.    Do not use Neosporin, as many people eventually develop a medication allergy, that can easily be confused with an infection, to Neomycin.    Return if:  There should not be any residual scaling. If there is any concern that the lesion has persisted after 4-6 weeks, make an appointment for a re-check. Healing time does vary depending on your individual healing process and the area of the body treated. Most patients will be healed in one month.    Signs of Infection:  Thankfully this is rare. However if you notice persistent colored drainage, increasing pain, fever or other signs of infection, please call us at: [phone]        The patient was counseled about sunscreens and sun avoidance. The patient was counseled to check the skin regularly and report any lesion that is new, changing, itching, scabbing, bleeding or otherwise bothersome. The patient was discharged ambulatory and in stable condition.    TT: 25 minutes.  CT: 15 minutes.

## 2020-08-26 NOTE — PROCEDURES
Name : Shave Excision  Indication : Excision of tissue for pathology evaluation.  Location(s) : Right dorsal foot - flat, slightly gritty erythematous lesion, 8 mm ? Actinic keratosis ? Superficial basal cell carcinoma ? Other  Completed by : Karen Ibrahim MD  Photo Taken : yes.  Anesthesia : Patient was anesthetized by infiltrating the area surrounding the lesion with 1% lidocaine.   epinephrine [id number] : Yes.  Note : Discussed the risk of pain, infection, scarring, hypo- or hyperpigmentation and recurrence or need for re-treatment. The benefits of treatment and alternative treatments were also discussed.    During this procedure, the universal protocol was utilized. The patient's identity was confirmed by no less than two patient identifiers, correct procedure was verified, correct site was verified and marked as applicable and a final pause was completed.    Sterile technique was used throughout the procedure. The skin was cleaned and prepped with surgical cleanser. Once adequate anesthesia was obtained, the lesion was removed with a deep scallop shave procedure. The specimen was sent to pathology.    Direct pressure and aluminum chloride and monopolar cautery was applied for hemostasis. No bleeding was present upon the completion of the procedure. The wound was coated with antibacterial ointment. A dry sterile dressing was applied. Patient tolerated the procedure well and left in satisfactory condition.    Primary provider and referring provider will be informed regarding the tissue report when it returns.

## 2020-08-26 NOTE — LETTER
8/26/2020         RE: Jeniffer Meyer  [address] [place] [address]        Dear Colleague,    Thank you for referring your patient, Jeniffer Meyer, to the [facility]. Please see a copy of my visit note below.    [facility] - PRIMARY CARE SKIN    CC: skin cancer screening (full-body)  SUBJECTIVE:   Jeniffer Meyer is a(n) 65 year old female who presents to clinic today for a full-body skin exam.    Issue one  : two brown spots on the left lateral hip    Personal Medical History  Skin cancer: NO - but history of actinic keratoses  Eczema Psoriasis Autoimmune   YES NO NO     Family Medical History  Skin cancer: YES - keratinocyte carcinoma(s) in father and brother  Eczema Psoriasis Autoimmune   NO NO NO     Sun Exposure History  Previous history of significant sun exposure:  Blistering sunburns: YES - when younger.  Tanning beds: YES - when younger.  Sunscreen usage: YES, frequency: daily.  UV-protective clothing usage: YES.    Occupation: [employer] retired    Refer to electronic medical record (EMR) for past medical history and medications.    INTEGUMENTARY/SKIN: NEGATIVE for worrisome rashes, moles or lesions  ROS: 14 point review of systems was negative except the symptoms listed above in the HPI.        OBJECTIVE:   GENERAL: healthy, alert and no distress.  SKIN: Sparks Skin Type - III.  This patient was examined from the top of the head to the bottom of the feet  including scalp, face, neck, trunk, buttocks, both arms, both legs, both hands, both feet, and all fingers and toes. The dermatoscope was used to help evaluate pigmented lesions. Diffuse actinic damage to the skin.  Skin Pertinent Findings:    Right dorsal foot - flat, slightly gritty erythematous lesion, 8 mm ? Actinic keratosis ? Superficial basal cell carcinoma ? Seborrheic keratosis ?Other            Left mid upper lip-   Erythematous, scaly, non-indurated lesion(s) most consistent with actinic  keratosis   Name: Liquid nitrogen Cry-Ac cryotherapy  Indication: Pre-malignant actinic keratosis  Completed by: Karen Ibrahim MD.  Note : Discussed natural history of lesion and treatment options. Prior to treatment, we discussed inflammation, tenderness post-procedure, the healing process, and the risks of pain, infection, scarring, blistering, and hypo-/hyperpigmentation after healing. Explained that these lesions may grow back and may need additional treatment or re-evaluation. The patient understood and verbally agreed to proceed with cryotherapy.    Each actinic keratosis was treated using liquid nitrogen Cry-Ac with a two five second bursts with a pause to allow for the area to thaw.    The patient tolerated the procedure well and left in good condition. If this lesion should persist or recur, then it needs to be re-evaluated.  Total number of lesions treated: 1  Trunk, arms, legs, :           Multiple rown, macule(s) most consistent with benign solar lentigo          Raised, coarse textured, stuck appearing lesion consistent with seborrheic keratosis           Brown macules of various sizes and shapes most consistent with (benign) melanocytic nevi                  ASSESSMENT:     Encounter Diagnoses   Name Primary?     Neoplasm of uncertain behavior of skin Yes     Actinic keratosis      Solar lentiginosis      Melanocytic nevi of trunk        PLAN:   Patient Instructions   FUTURE APPOINTMENTS  Follow up per pathology report. You will be notified, generally via letter or MyChart, in approximately 10 days. If there is anything we need to discuss or further treatment needed, I will call you to discuss it.    Vaseline and a bandage for 5-7 days.     Skin exam once a year     Can shower normally     WOUND CARE INSTRUCTIONS  1. Wash hands before every dressing change.  2. Change the dressing after 24 hours and once daily, or earlier if it becomes saturated.  3. Wash the wound area with a mild soap, then  "rinse.  4. Gently pat dry with a sterile gauze or Q-tip.  5. Using a Q-tip, apply Vaseline or Aquaphor only over entire wound. DO NOT use Neosporin - as many people react to neomycin.  6. Finally, cover with a bandage or sterile non-stick gauze with micropore paper tape.  7. Repeat once daily until wound has healed.      Soap, water and shampoo will not hurt this area.    Do not go swimming or take baths, but showering is encouraged.    Limit use of the area where the procedure was done for a few days to allow for optimal healing.    Signs of Infection:  Infection can occur in any area where skin has been disrupted. If you notice persistent redness, swelling, colored drainage, increasing pain, fever or other signs of infection, please call us at: [phone] and ask to have me or my colleague paged. We will call you back to discuss.    If you experience bleeding:  Wash hands and hold firm pressure on the area for 10 minutes without checking to see if the bleeding has stopped. \"Checking\" pulls off the protective wound clot and restarts the bleeding all over again. Re-apply pressure for 10 minutes if necessary to stop bleeding.  Use additional sterile gauze and tape to maintain pressure once bleeding has stopped.  If bleeding continues, then call back to clinic at [phone].    PATIENT INFORMATION : WOUNDS  During the healing process you will notice a number of changes.     All wounds normally drain.  The larger the wound the more drainage there will be.  After 7-10 days, you will notice the wound beginning to shrink and new skin will begin to grow.  The wound is healed when you can see that skin has formed over the entire area.  A healed wound has a healthy, shiny look to the surface and is red to dark pink in color to normalize.  Wounds may take approximately 4-6 weeks to heal.  Larger wounds may take 6-8 weeks. After the wound is healed you may discontinue dressing changes.    All wounds develop a small " halo of redness surrounding the wound which means that healing is occurring. Severe itching with extensive redness usually indicates sensitivity to the ointment or bandage tape used to dress the wound.  You should call our office if severe itching with extensive redness develops.    Swelling  and/or discoloration around your surgical site is common, particularly when performed around the eye.  You may experience a sensation of tightness as your wound heals. This is normal and will gradually subside.  Your healed wound may be sensitive to temperature changes. This sensitivity improves with time, but if you re having a lot of discomfort, try to avoid temperature extremes.  Patients frequently experience itching after their wound appears to have healed because of the continue healing under the skin.  Plain Vaseline will help relieve the itching.    ACTINIC KERATOSES POST-TREATMENT CARE INSTRUCTIONS  Actinic keratoses are benign, scaly or gritty lesions that appear in sun-exposed areas and may progress to skin cancers. For this reason, it is important to treat them before they become cancerous. Liquid nitrogen is the most commonly used and most effective treatment for actinic keratoses; it is mildly uncomfortable when applied to the skin, but the discomfort rapidly subsides.    Post-Treatment:  You may experience burning and/or stinging immediately following the procedure. The discomfort from the procedure may persist over the next 12-24 hours. The area treated will look pinker and slightly swollen before the healing process begins. You may also notice redness, swelling, tenderness, weeping and crusts or scabs. Healing time is approximately 10-14 days.    Blister - You may or may notice blistering from the freezing. If you develop an uncomfortable blister from today's treatment, you may gently puncture this with a needle that has been cleaned with alcohol. However, do not remove the protective skin layer of the  blister.    Scab - After a few days, you may notice scaliness or scab formation. Do not pick at the scabs because this may cause slower healing and a permanent scar.    The skin may appear temporarily darker at the treatment site, but this usually fades over a period of months, provided that the area is protected from the sun.    Care of the areas treated:    Wash the area with a mild cleanser.    Gently pat dry.    Do not rub.     Keep protected from the sun during the healing process and for a full year following treatment as the skin continues to remodel during this time.    Apply Vaseline or Aquaphor ointment sparingly to the site for the first 7 days after treatment.    Do not use Neosporin, as many people eventually develop a medication allergy, that can easily be confused with an infection, to Neomycin.    Return if:  There should not be any residual scaling. If there is any concern that the lesion has persisted after 4-6 weeks, make an appointment for a re-check. Healing time does vary depending on your individual healing process and the area of the body treated. Most patients will be healed in one month.    Signs of Infection:  Thankfully this is rare. However if you notice persistent colored drainage, increasing pain, fever or other signs of infection, please call us at: [phone]        The patient was counseled about sunscreens and sun avoidance. The patient was counseled to check the skin regularly and report any lesion that is new, changing, itching, scabbing, bleeding or otherwise bothersome. The patient was discharged ambulatory and in stable condition.    TT: 25 minutes.  CT: 15 minutes.      Again, thank you for allowing me to participate in the care of your patient.        Sincerely,        Jinny Ibrahim MD

## 2020-08-26 NOTE — PATIENT INSTRUCTIONS
"FUTURE APPOINTMENTS  Follow up per pathology report. You will be notified, generally via letter or MyChart, in approximately 10 days. If there is anything we need to discuss or further treatment needed, I will call you to discuss it.    Vaseline and a bandage for 5-7 days.     Skin exam once a year     Can shower normally     WOUND CARE INSTRUCTIONS  1. Wash hands before every dressing change.  2. Change the dressing after 24 hours and once daily, or earlier if it becomes saturated.  3. Wash the wound area with a mild soap, then rinse.  4. Gently pat dry with a sterile gauze or Q-tip.  5. Using a Q-tip, apply Vaseline or Aquaphor only over entire wound. DO NOT use Neosporin - as many people react to neomycin.  6. Finally, cover with a bandage or sterile non-stick gauze with micropore paper tape.  7. Repeat once daily until wound has healed.      Soap, water and shampoo will not hurt this area.    Do not go swimming or take baths, but showering is encouraged.    Limit use of the area where the procedure was done for a few days to allow for optimal healing.    Signs of Infection:  Infection can occur in any area where skin has been disrupted. If you notice persistent redness, swelling, colored drainage, increasing pain, fever or other signs of infection, please call us at: [phone] and ask to have me or my colleague paged. We will call you back to discuss.    If you experience bleeding:  Wash hands and hold firm pressure on the area for 10 minutes without checking to see if the bleeding has stopped. \"Checking\" pulls off the protective wound clot and restarts the bleeding all over again. Re-apply pressure for 10 minutes if necessary to stop bleeding.  Use additional sterile gauze and tape to maintain pressure once bleeding has stopped.  If bleeding continues, then call back to clinic at [phone].    PATIENT INFORMATION : WOUNDS  During the healing process you will notice a number of changes.     All wounds " normally drain.  The larger the wound the more drainage there will be.  After 7-10 days, you will notice the wound beginning to shrink and new skin will begin to grow.  The wound is healed when you can see that skin has formed over the entire area.  A healed wound has a healthy, shiny look to the surface and is red to dark pink in color to normalize.  Wounds may take approximately 4-6 weeks to heal.  Larger wounds may take 6-8 weeks. After the wound is healed you may discontinue dressing changes.    All wounds develop a small halo of redness surrounding the wound which means that healing is occurring. Severe itching with extensive redness usually indicates sensitivity to the ointment or bandage tape used to dress the wound.  You should call our office if severe itching with extensive redness develops.    Swelling  and/or discoloration around your surgical site is common, particularly when performed around the eye.  You may experience a sensation of tightness as your wound heals. This is normal and will gradually subside.  Your healed wound may be sensitive to temperature changes. This sensitivity improves with time, but if you re having a lot of discomfort, try to avoid temperature extremes.  Patients frequently experience itching after their wound appears to have healed because of the continue healing under the skin.  Plain Vaseline will help relieve the itching.    ACTINIC KERATOSES POST-TREATMENT CARE INSTRUCTIONS  Actinic keratoses are benign, scaly or gritty lesions that appear in sun-exposed areas and may progress to skin cancers. For this reason, it is important to treat them before they become cancerous. Liquid nitrogen is the most commonly used and most effective treatment for actinic keratoses; it is mildly uncomfortable when applied to the skin, but the discomfort rapidly subsides.    Post-Treatment:  You may experience burning and/or stinging immediately following the procedure. The discomfort from the  procedure may persist over the next 12-24 hours. The area treated will look pinker and slightly swollen before the healing process begins. You may also notice redness, swelling, tenderness, weeping and crusts or scabs. Healing time is approximately 10-14 days.    Blister - You may or may notice blistering from the freezing. If you develop an uncomfortable blister from today's treatment, you may gently puncture this with a needle that has been cleaned with alcohol. However, do not remove the protective skin layer of the blister.    Scab - After a few days, you may notice scaliness or scab formation. Do not pick at the scabs because this may cause slower healing and a permanent scar.    The skin may appear temporarily darker at the treatment site, but this usually fades over a period of months, provided that the area is protected from the sun.    Care of the areas treated:    Wash the area with a mild cleanser.    Gently pat dry.    Do not rub.     Keep protected from the sun during the healing process and for a full year following treatment as the skin continues to remodel during this time.    Apply Vaseline or Aquaphor ointment sparingly to the site for the first 7 days after treatment.    Do not use Neosporin, as many people eventually develop a medication allergy, that can easily be confused with an infection, to Neomycin.    Return if:  There should not be any residual scaling. If there is any concern that the lesion has persisted after 4-6 weeks, make an appointment for a re-check. Healing time does vary depending on your individual healing process and the area of the body treated. Most patients will be healed in one month.    Signs of Infection:  Thankfully this is rare. However if you notice persistent colored drainage, increasing pain, fever or other signs of infection, please call us at: [phone]

## 2020-08-29 LAB — COPATH REPORT: NORMAL

## 2020-09-02 ENCOUNTER — TELEPHONE (OUTPATIENT)
Dept: FAMILY MEDICINE | Facility: CLINIC | Age: 65
End: 2020-09-02

## 2020-09-02 NOTE — TELEPHONE ENCOUNTER
Phone call :      Because of high risk area recommended Mohs surgery       Needs referral for Mohs with Dr. Navarro , after October 16 2020.     Thank you,   Jinny Ibrahim M.D.   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

## 2020-09-03 NOTE — TELEPHONE ENCOUNTER
patient notified of test results and mohs procedure explained- appointment scheduled- letter mailed.    Mell BELL RN BSN  [facility]  [phone]  [facility]   [phone]

## 2020-10-02 ENCOUNTER — TELEPHONE (OUTPATIENT)
Dept: DERMATOLOGY | Facility: CLINIC | Age: 65
End: 2020-10-02

## 2020-10-02 NOTE — TELEPHONE ENCOUNTER
Patient called to cancel her mohs procedure on 10/22/2020 needs to reschedule this asap please call patient to reschedule her mohs appointment  Her phone is [phone] joseph

## 2020-10-02 NOTE — TELEPHONE ENCOUNTER
Called and spoke to patient.    Rescheduled mohs appointment for a later date.    Patient voiced understanding.    DEE Miller-BSN-PHN  [facility]  [phone]

## 2020-10-07 ENCOUNTER — TRANSFERRED RECORDS (OUTPATIENT)
Dept: HEALTH INFORMATION MANAGEMENT | Facility: CLINIC | Age: 65
End: 2020-10-07

## 2020-11-05 ENCOUNTER — OFFICE VISIT (OUTPATIENT)
Dept: DERMATOLOGY | Facility: CLINIC | Age: 65
End: 2020-11-05
Payer: COMMERCIAL

## 2020-11-05 VITALS — HEART RATE: 102 BPM | OXYGEN SATURATION: 99 % | DIASTOLIC BLOOD PRESSURE: 115 MMHG | SYSTOLIC BLOOD PRESSURE: 198 MMHG

## 2020-11-05 DIAGNOSIS — L81.4 LENTIGO: ICD-10-CM

## 2020-11-05 DIAGNOSIS — L82.1 SEBORRHEIC KERATOSIS: ICD-10-CM

## 2020-11-05 DIAGNOSIS — D04.71 SQUAMOUS CELL CARCINOMA IN SITU (SCCIS) OF DORSUM OF RIGHT FOOT: Primary | ICD-10-CM

## 2020-11-05 DIAGNOSIS — D18.01 ANGIOMA OF SKIN: ICD-10-CM

## 2020-11-05 PROCEDURE — 17311 MOHS 1 STAGE H/N/HF/G: CPT | Performed by: DERMATOLOGY

## 2020-11-05 PROCEDURE — 99203 OFFICE O/P NEW LOW 30 MIN: CPT | Mod: 25 | Performed by: DERMATOLOGY

## 2020-11-05 NOTE — PROGRESS NOTES
Jeniffer Meyer , a 65 year old year old female patient, I was asked to see by Dr. Ornelas for squamous cell carcinoma in situ on right dorsal foot.  Patient states this has been present for a while.  Patient reports the following symptoms:  Not healing .  Patient reports the following previous treatments none.  Patient reports the following modifying factors none.  Associated symptoms: none.  Patient has no other skin complaints today.  Remainder of the HPI, Meds, PMH, Allergies, FH, and SH was reviewed in chart.      Past Medical History:   Diagnosis Date     Anxiety      Arthritis      Colon polyp 09/2017    advanced tubular adenoma; colonoscopy q3y     Hypertension, essential     followed by PCP (Dr. Jaffe)     Migraine      Osteoporosis 1/2014    hip T-score -2.8 (left) and -2.7 (right), on Fosomax     Squamous cell carcinoma of skin, unspecified        Past Surgical History:   Procedure Laterality Date     ENDOMETRIAL SAMPLING (BIOPSY)  4/2006    PMB-->path: benign     FOOT SURGERY       HAND SURGERY      thumb     HC ABLATION, ENDOMETRIAL, THERMAL, W/O HYSTEROSCOPIC GUIDANCE  6/2004    ([facility])     MAMMOPLASTY AUGMENTATION Bilateral     saline implants        Family History   Problem Relation Age of Onset     Breast Cancer Sister 52     Breast Cancer Paternal Grandmother      Colon Cancer Father      Hyperlipidemia Father      Hypertension Father      Diabetes Mother      Fractures Mother         Hip     Hyperlipidemia Mother      Hypertension Mother      Osteoporosis Mother      Heart Disease Brother      Osteoporosis Maternal Grandmother      Osteoporosis Maternal Grandfather        Social History     Socioeconomic History     Marital status:      Spouse name: Not on file     Number of children: 1     Years of education: Not on file     Highest education level: Not on file   Occupational History     Occupation:      Employer: [employer]   Social Needs     Financial resource  strain: Not on file     Food insecurity     Worry: Not on file     Inability: Not on file     Transportation needs     Medical: Not on file     Non-medical: Not on file   Tobacco Use     Smoking status: Never Smoker     Smokeless tobacco: Never Used   Substance and Sexual Activity     Alcohol use: Yes     Alcohol/week: 0.0 standard drinks     Drug use: No     Sexual activity: Not Currently     Partners: Male     Birth control/protection: Post-menopausal   Lifestyle     Physical activity     Days per week: Not on file     Minutes per session: Not on file     Stress: Not on file   Relationships     Social connections     Talks on phone: Not on file     Gets together: Not on file     Attends Bahai service: Not on file     Active member of club or organization: Not on file     Attends meetings of clubs or organizations: Not on file     Relationship status: Not on file     Intimate partner violence     Fear of current or ex partner: Not on file     Emotionally abused: Not on file     Physically abused: Not on file     Forced sexual activity: Not on file   Other Topics Concern     Parent/sibling w/ CABG, MI or angioplasty before 65F 55M? Not Asked   Social History Narrative     Not on file       Outpatient Encounter Medications as of 11/5/2020   Medication Sig Dispense Refill     alendronate (FOSAMAX) 70 MG tablet TAKE 1 TABLET EVERY 7 DAYS 12 tablet 3     Ascorbic Acid (VITAMIN C PO)        atorvastatin (LIPITOR) 10 MG tablet Take 10 mg by mouth       desonide (DESOWEN) 0.05 % cream APPLY SPARINGLY TO AFFECTED AREA TWO TIMES PER DAY FOR NO MORE THEN 3 -5 CONSECUTIVE DAYS 15 g 0     lisinopril (PRINIVIL/ZESTRIL) 5 MG tablet Take 10 mg by mouth 2 times daily        LORazepam (ATIVAN) 1 MG tablet Take 1 mg by mouth       MAGNESIUM OXIDE PO        Multiple Vitamins-Minerals (MULTIVITAMIN PO)        Wheat Dextrin (BENEFIBER PO)        No facility-administered encounter medications on file as of 11/5/2020.               Review Of Systems  Skin: As above  Eyes: negative  Ears/Nose/Throat: negative  Respiratory: No shortness of breath, dyspnea on exertion, cough, or hemoptysis  Cardiovascular: negative  Gastrointestinal: negative  Genitourinary: negative  Musculoskeletal: negative  Neurologic: negative  Psychiatric: negative  Hematologic/Lymphatic/Immunologic: negative  Endocrine: negative      O:   NAD, WDWN, Alert & Oriented, Mood & Affect wnl, Vitals stable   Here today alone   BP (!) 198/115   Pulse 102   SpO2 99%    General appearance gretchen ii   Vitals stable   Alert, oriented and in no acute distress      Stuck on papules and brown macules on trunk and ext    Red papules on trunk    R dorsal foot ill-deinfed 11mm red scaly plaque      The remainder of expanded problem focused exam was normal; the following areas were examined:  conjunctiva/lids, face, neck, , chest, digits/nails, RUE, LUE, feet, legs .      Eyes: Conjunctivae/lids:Normal     ENT: Lips, buccal mucosa, tongue: normal    MSK:Normal    Cardiovascular: peripheral edema none    Pulm: Breathing Normal    Neuro/Psych: Orientation:Normal; Mood/Affect:Normal      A/P:  1. 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 Seborrheic keratosis, lentigo , angioma  BENIGN LESIONS DISCUSSED WITH PATIENT:  I discussed the specifics of tumor, prognosis, and genetics of benign lesions.  I explained that treatment of these lesions would be purely cosmetic and not medically neccessary.  I discussed with patient different removal options including excision, cautery and /or laser.      Nature and genetics of benign skin lesions dicussed with patient.  Signs and Symptoms of skin cancer discussed with patient.  Patient encouraged to perform monthly skin exams.  UV precautions reviewed with patient.    Skin care regimen reviewed with patient: Eliminate harsh soaps, i.e. Dial, zest, irsih spring; Mild soaps such as Cetaphil or Dove sensitive skin, avoid hot or cold showers,  aggressive use of emollients including vanicream, cetaphil or cerave discussed with patient.    Risks of non-melanoma skin cancer discussed with patient   Return to clinic 6 months  PROCEDURE NOTE  R foot squamous cell carcinoma in situ  MOHS:   Location and Ill-defined margins    The rationale for Mohs surgery was discussed with the patient and consent was obtained.  The risks and benefits as well as alternatives to therapy were discussed, in detail.  Specifically, the risks of infection, scarring, bleeding, prolonged wound healing, incomplete removal, allergy to anesthesia, nerve injury and recurrence were addressed.  Indication for Mohs was Location and Ill-defined margins. Prior to the procedure, the treatment site was clearly identified and, if available, confirmed with previous photos and confirmed by the patient   All components of the Universal Protocol/PAUSE rule were completed.  The Mohs surgeon operated in two distinct and integrated capacities as the surgeon and pathologist.      The area was prepped with Betasept.  A rim of normal appearing skin was marked circumferentially around the lesion.  The area was infiltrated with local anesthesia.  The tumor was first debulked to remove all clinically apparent tumor.  An incision following the standard Mohs approach was done and the specimen was oriented,mapped and placed in 1 block(s).  Each specimen was then chromacoded and processed in the Mohs laboratory using standard Mohs technique and submitted for frozen section histology.  Frozen section analysis showed no residual tumor but CLEAR MARGINS.      The tumor was excised using standard Mohs technique in 1 stages(s).  CLEAR MARGINS OBTAINED and Final defect size was1.5 cm.     We discussed the options for wound management in full with the patient including risks/benefits/ possible outcomes.        REPAIR SECOND INTENT: We discussed the options for wound management in full with the patient including  risks/benefits/possible outcomes. Decision made to allow the wound to heal by second intention. EBL minimal; complications none; wound care routine.  The patient was discharged in good condition and will return in one month or prn for wound evaluation.

## 2020-11-05 NOTE — LETTER
11/5/2020         RE: Jeniffer Meyer  [address] [place] [address]        Dear Colleague,    Thank you for referring your patient, Jeniffer Meyer, to the [facility]. Please see a copy of my visit note below.    Surgical Office Location:  [facility]  [address] [place] [id number]      Jeniffer Meyer , a 65 year old year old female patient, I was asked to see by Dr. Ornelas for squamous cell carcinoma in situ on right dorsal foot.  Patient states this has been present for a while.  Patient reports the following symptoms:  Not healing .  Patient reports the following previous treatments none.  Patient reports the following modifying factors none.  Associated symptoms: none.  Patient has no other skin complaints today.  Remainder of the HPI, Meds, PMH, Allergies, FH, and SH was reviewed in chart.      Past Medical History:   Diagnosis Date     Anxiety      Arthritis      Colon polyp 09/2017    advanced tubular adenoma; colonoscopy q3y     Hypertension, essential     followed by PCP (Dr. Jaffe)     Migraine      Osteoporosis 1/2014    hip T-score -2.8 (left) and -2.7 (right), on Fosomax     Squamous cell carcinoma of skin, unspecified        Past Surgical History:   Procedure Laterality Date     ENDOMETRIAL SAMPLING (BIOPSY)  4/2006    PMB-->path: benign     FOOT SURGERY       HAND SURGERY      thumb     HC ABLATION, ENDOMETRIAL, THERMAL, W/O HYSTEROSCOPIC GUIDANCE  6/2004    ([facility])     MAMMOPLASTY AUGMENTATION Bilateral     saline implants        Family History   Problem Relation Age of Onset     Breast Cancer Sister 52     Breast Cancer Paternal Grandmother      Colon Cancer Father      Hyperlipidemia Father      Hypertension Father      Diabetes Mother      Fractures Mother         Hip     Hyperlipidemia Mother      Hypertension Mother      Osteoporosis Mother      Heart Disease Brother      Osteoporosis Maternal Grandmother       Osteoporosis Maternal Grandfather        Social History     Socioeconomic History     Marital status:      Spouse name: Not on file     Number of children: 1     Years of education: Not on file     Highest education level: Not on file   Occupational History     Occupation:      Employer: [employer]   Social Needs     Financial resource strain: Not on file     Food insecurity     Worry: Not on file     Inability: Not on file     Transportation needs     Medical: Not on file     Non-medical: Not on file   Tobacco Use     Smoking status: Never Smoker     Smokeless tobacco: Never Used   Substance and Sexual Activity     Alcohol use: Yes     Alcohol/week: 0.0 standard drinks     Drug use: No     Sexual activity: Not Currently     Partners: Male     Birth control/protection: Post-menopausal   Lifestyle     Physical activity     Days per week: Not on file     Minutes per session: Not on file     Stress: Not on file   Relationships     Social connections     Talks on phone: Not on file     Gets together: Not on file     Attends Jainism service: Not on file     Active member of club or organization: Not on file     Attends meetings of clubs or organizations: Not on file     Relationship status: Not on file     Intimate partner violence     Fear of current or ex partner: Not on file     Emotionally abused: Not on file     Physically abused: Not on file     Forced sexual activity: Not on file   Other Topics Concern     Parent/sibling w/ CABG, MI or angioplasty before 65F 55M? Not Asked   Social History Narrative     Not on file       Outpatient Encounter Medications as of 11/5/2020   Medication Sig Dispense Refill     alendronate (FOSAMAX) 70 MG tablet TAKE 1 TABLET EVERY 7 DAYS 12 tablet 3     Ascorbic Acid (VITAMIN C PO)        atorvastatin (LIPITOR) 10 MG tablet Take 10 mg by mouth       desonide (DESOWEN) 0.05 % cream APPLY SPARINGLY TO AFFECTED AREA TWO TIMES PER DAY FOR NO MORE THEN 3 -5  CONSECUTIVE DAYS 15 g 0     lisinopril (PRINIVIL/ZESTRIL) 5 MG tablet Take 10 mg by mouth 2 times daily        LORazepam (ATIVAN) 1 MG tablet Take 1 mg by mouth       MAGNESIUM OXIDE PO        Multiple Vitamins-Minerals (MULTIVITAMIN PO)        Wheat Dextrin (BENEFIBER PO)        No facility-administered encounter medications on file as of 11/5/2020.              Review Of Systems  Skin: As above  Eyes: negative  Ears/Nose/Throat: negative  Respiratory: No shortness of breath, dyspnea on exertion, cough, or hemoptysis  Cardiovascular: negative  Gastrointestinal: negative  Genitourinary: negative  Musculoskeletal: negative  Neurologic: negative  Psychiatric: negative  Hematologic/Lymphatic/Immunologic: negative  Endocrine: negative      O:   NAD, WDWN, Alert & Oriented, Mood & Affect wnl, Vitals stable   Here today alone   BP (!) 198/115   Pulse 102   SpO2 99%    General appearance gretchen ii   Vitals stable   Alert, oriented and in no acute distress      Stuck on papules and brown macules on trunk and ext    Red papules on trunk    R dorsal foot ill-deinfed 11mm red scaly plaque      The remainder of expanded problem focused exam was normal; the following areas were examined:  conjunctiva/lids, face, neck, , chest, digits/nails, RUE, LUE, feet, legs .      Eyes: Conjunctivae/lids:Normal     ENT: Lips, buccal mucosa, tongue: normal    MSK:Normal    Cardiovascular: peripheral edema none    Pulm: Breathing Normal    Neuro/Psych: Orientation:Normal; Mood/Affect:Normal      A/P:  1. 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 Seborrheic keratosis, lentigo , angioma  BENIGN LESIONS DISCUSSED WITH PATIENT:  I discussed the specifics of tumor, prognosis, and genetics of benign lesions.  I explained that treatment of these lesions would be purely cosmetic and not medically neccessary.  I discussed with patient different removal options including excision, cautery and /or laser.      Nature and genetics of benign skin lesions  dicussed with patient.  Signs and Symptoms of skin cancer discussed with patient.  Patient encouraged to perform monthly skin exams.  UV precautions reviewed with patient.    Skin care regimen reviewed with patient: Eliminate harsh soaps, i.e. Dial, zest, irsih spring; Mild soaps such as Cetaphil or Dove sensitive skin, avoid hot or cold showers, aggressive use of emollients including vanicream, cetaphil or cerave discussed with patient.    Risks of non-melanoma skin cancer discussed with patient   Return to clinic 6 months  PROCEDURE NOTE  R foot squamous cell carcinoma in situ  MOHS:   Location and Ill-defined margins    The rationale for Mohs surgery was discussed with the patient and consent was obtained.  The risks and benefits as well as alternatives to therapy were discussed, in detail.  Specifically, the risks of infection, scarring, bleeding, prolonged wound healing, incomplete removal, allergy to anesthesia, nerve injury and recurrence were addressed.  Indication for Mohs was Location and Ill-defined margins. Prior to the procedure, the treatment site was clearly identified and, if available, confirmed with previous photos and confirmed by the patient   All components of the Universal Protocol/PAUSE rule were completed.  The Mohs surgeon operated in two distinct and integrated capacities as the surgeon and pathologist.      The area was prepped with Betasept.  A rim of normal appearing skin was marked circumferentially around the lesion.  The area was infiltrated with local anesthesia.  The tumor was first debulked to remove all clinically apparent tumor.  An incision following the standard Mohs approach was done and the specimen was oriented,mapped and placed in 1 block(s).  Each specimen was then chromacoded and processed in the Mohs laboratory using standard Mohs technique and submitted for frozen section histology.  Frozen section analysis showed no residual tumor but CLEAR MARGINS.      The tumor was  excised using standard Mohs technique in 1 stages(s).  CLEAR MARGINS OBTAINED and Final defect size was 1.7 x 1.5 cm.     We discussed the options for wound management in full with the patient including risks/benefits/ possible outcomes.        REPAIR SECOND INTENT: We discussed the options for wound management in full with the patient including risks/benefits/possible outcomes. Decision made to allow the wound to heal by second intention. EBL minimal; complications none; wound care routine.  The patient was discharged in good condition and will return in one month or prn for wound evaluation.        Again, thank you for allowing me to participate in the care of your patient.        Sincerely,        Abraham Navarro MD

## 2020-11-05 NOTE — NURSING NOTE
BP (!) 198/115   Pulse 102   SpO2 99%     Patient report white coat syndrome. Gets very anxious when going to the doctors. Took BP this morning which was normal. Denies any headache, SOB, or nausea. Declined recheck.

## 2020-11-05 NOTE — PATIENT INSTRUCTIONS
Open Wound Care     for ___Right dorsal foot___________        ? No strenuous activity for 48 hours    ? Take Tylenol as needed for discomfort.                                                .         ? Do not drink alcoholic beverages for 48 hours.    ? Keep the pressure bandage in place for 24 hours. If the bandage becomes blood tinged or loose, reinforce it with gauze and tape.        (Refer to the reverse side of this page for management of bleeding).    ? Remove bandage in 24 hours and begin wound care as follows:     1. Clean area with tap water using a Q tip or gauze pad, (shower / bathe normally)  2. Dry wound with Q tip or gauze pad  3. Apply Aquaphor, Vaseline, Polysporin or Bacitracin Ointment with a Q tip    Do NOT use Neosporin Ointment *  4. Cover the wound with a band-aid or nonstick gauze pad and paper tape.  5. Repeat wound care once a day until wound is completely healed.    It is an old wives tale that a wound heals better when it is exposed to air and allowed to dry out. The wound will heal faster with a better cosmetic result if it is kept moist with ointment and covered with a bandage.  Do not let the wound dry out.      Supplies Needed:                Qtips or gauze pads                Polysporin or Bacitracin Ointment                Bandaids or nonstick gauze pads and paper tape    Wound care kits and brown paper tape are available for purchase at   the pharmacy.    BLEEDIN. Use tightly rolled up gauze or cloth to apply direct pressure over the bandage for 20   minutes.  2. Reapply pressure for an additional 20 minutes if necessary  3. Call the office or go to the nearest emergency room if pressure fails to stop the bleeding.  4. Use additional gauze and tape to maintain pressure once the bleeding has stopped.  5. Begin wound care 24 hours after surgery as directed.                  WOUND HEALING    1. One week after surgery a pink / red halo will form around the outside of the wound.    This is new skin.  2. The center of the wound will appear yellowish white and produce some drainage.  3. The pink halo will slowly migrate in toward the center of the wound until the wound is covered with new shiny pink skin.  4. There will be no more drainage when the wound is completely healed.  5. It will take six months to one year for the redness to fade.  6. The scar may be itchy, tight and sensitive to extreme temperatures for a year after the surgery.  7. Massaging the area several times a day for several minutes after the wound is completely healed will help the scar soften and normalize faster. Begin massage only after healing is complete.      In case of emergency call: Dr Navarro: [phone]     [facility]: [phone]    [facility]: [phone]

## 2021-04-12 NOTE — PROGRESS NOTES
[facility] - PRIMARY CARE SKIN    CC: skin cancer screening (full-body)  SUBJECTIVE:   Jeniffer Meyer is a(n) 65 year old female who presents to clinic today for a full-body skin exam.      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  Tanning beds: YES - when younger.  Sunscreen usage: YES, frequency: daily.  UV-protective clothing usage: YES.    Occupation: [employer]- retired  Refer to electronic medical record (EMR) for past medical history and medications.    ROS: 14 point review of systems was negative except the symptoms listed above in the HPI.    OBJECTIVE:   GENERAL: healthy, alert and no distress.  HEENT: PERRL. Conjunctiva, sclera clear.  SKIN: Sparks Skin Type - III.  This patient was examined from the top of the head to the bottom of the feet  including scalp, face, neck, trunk, buttocks, both arms, both legs, both hands, both feet, and all fingers and toes. The dermatoscope was used to help evaluate pigmented lesions.  Skin Pertinent Findings:  Face: clear    Trunk, arms, legs,  :           Brown, macule(s) most consistent with benign solar lentigo          Raised, coarse textured, stuck appearing lesion consistent with seborrheic keratosis .          Slightly raised, red lesion(s) consistent with capillary hemangioma          Brown macules of various sizes and shapes most consistent with (benign) melanocytic nevi       Dorsum of right foot- well healed shave excision site         ASSESSMENT:     Encounter Diagnoses   Name Primary?     Solar lentiginosis Yes     Multiple  benign melanocytic nevi of upper and lower extremities and trunk      History of squamous cell carcinoma in situ of skin      Skin exam for malignant neoplasm      Eyelid dermatitis, eczematous, unspecified laterality          PLAN:   Patient Instructions   Skin exam in 6 months    Eye dermatitis     Desonide 0.05% cream apply to affected eyelids when needed not more then 3-5 days        The patient was counseled about sunscreens and sun avoidance. The patient was counseled to check the skin regularly and report any lesion that is new, changing, itching, scabbing, bleeding or otherwise bothersome. The patient was discharged ambulatory and in stable condition.

## 2021-04-13 ENCOUNTER — OFFICE VISIT (OUTPATIENT)
Dept: FAMILY MEDICINE | Facility: CLINIC | Age: 66
End: 2021-04-13
Payer: COMMERCIAL

## 2021-04-13 DIAGNOSIS — D22.62 MULTIPLE BENIGN MELANOCYTIC NEVI OF UPPER AND LOWER EXTREMITIES AND TRUNK: ICD-10-CM

## 2021-04-13 DIAGNOSIS — D22.5 MULTIPLE BENIGN MELANOCYTIC NEVI OF UPPER AND LOWER EXTREMITIES AND TRUNK: ICD-10-CM

## 2021-04-13 DIAGNOSIS — Z12.83 SKIN EXAM FOR MALIGNANT NEOPLASM: ICD-10-CM

## 2021-04-13 DIAGNOSIS — D22.71 MULTIPLE BENIGN MELANOCYTIC NEVI OF UPPER AND LOWER EXTREMITIES AND TRUNK: ICD-10-CM

## 2021-04-13 DIAGNOSIS — H01.139 EYELID DERMATITIS, ECZEMATOUS, UNSPECIFIED LATERALITY: ICD-10-CM

## 2021-04-13 DIAGNOSIS — D22.72 MULTIPLE BENIGN MELANOCYTIC NEVI OF UPPER AND LOWER EXTREMITIES AND TRUNK: ICD-10-CM

## 2021-04-13 DIAGNOSIS — D22.61 MULTIPLE BENIGN MELANOCYTIC NEVI OF UPPER AND LOWER EXTREMITIES AND TRUNK: ICD-10-CM

## 2021-04-13 DIAGNOSIS — Z86.007 HISTORY OF SQUAMOUS CELL CARCINOMA IN SITU OF SKIN: ICD-10-CM

## 2021-04-13 DIAGNOSIS — L81.4 SOLAR LENTIGINOSIS: Primary | ICD-10-CM

## 2021-04-13 PROCEDURE — 99213 OFFICE O/P EST LOW 20 MIN: CPT | Performed by: FAMILY MEDICINE

## 2021-04-13 RX ORDER — DESONIDE 0.5 MG/G
CREAM TOPICAL
Qty: 15 G | Refills: 1 | Status: SHIPPED | OUTPATIENT
Start: 2021-04-13

## 2021-04-13 NOTE — PATIENT INSTRUCTIONS
Skin exam in 6 months    Eye dermatitis     Desonide 0.05% cream apply to affected eyelids when needed not more then 3-5 days

## 2021-04-13 NOTE — LETTER
4/13/2021         RE: Jeniffer Meyer  [address] [place] [address]        Dear Colleague,    Thank you for referring your patient, Jeniffer Meyer, to the [facility]. Please see a copy of my visit note below.    [facility] - PRIMARY CARE SKIN    CC: skin cancer screening (full-body)  SUBJECTIVE:   Jenfifer Meyer is a(n) 65 year old female who presents to clinic today for a full-body skin exam.      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  Tanning beds: YES - when younger.  Sunscreen usage: YES, frequency: daily.  UV-protective clothing usage: YES.    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    ROS: 14 point review of systems was negative except the symptoms listed above in the HPI.    OBJECTIVE:   GENERAL: healthy, alert and no distress.  HEENT: PERRL. Conjunctiva, sclera clear.  SKIN: Sparks Skin Type - III.  This patient was examined from the top of the head to the bottom of the feet  including scalp, face, neck, trunk, buttocks, both arms, both legs, both hands, both feet, and all fingers and toes. The dermatoscope was used to help evaluate pigmented lesions.  Skin Pertinent Findings:  Face: clear    Trunk, arms, legs,  :           Brown, macule(s) most consistent with benign solar lentigo          Raised, coarse textured, stuck appearing lesion consistent with seborrheic keratosis .          Slightly raised, red lesion(s) consistent with capillary hemangioma           Brown macules of various sizes and shapes most consistent with (benign) melanocytic nevi       Dorsum of right foot- well healed shave excision site         ASSESSMENT:     Encounter Diagnoses   Name Primary?     Solar lentiginosis Yes     Multiple benign melanocytic nevi of upper and lower extremities and trunk      History of squamous cell carcinoma in situ of skin      Skin exam for malignant neoplasm      Eyelid dermatitis, eczematous, unspecified laterality          PLAN:   Patient Instructions   Skin exam in 6 months    Eye dermatitis     Desonide 0.05% cream apply to affected eyelids when needed not more then 3-5 days        The patient was counseled about sunscreens and sun avoidance. The patient was counseled to check the skin regularly and report any lesion that is new, changing, itching, scabbing, bleeding or otherwise bothersome. The patient was discharged ambulatory and in stable condition.            Again, thank you for allowing me to participate in the care of your patient.        Sincerely,        Jinny Ibrahim MD

## 2021-07-23 ENCOUNTER — ANCILLARY PROCEDURE (OUTPATIENT)
Dept: MAMMOGRAPHY | Facility: CLINIC | Age: 66
End: 2021-07-23
Payer: COMMERCIAL

## 2021-07-23 ENCOUNTER — OFFICE VISIT (OUTPATIENT)
Dept: OBGYN | Facility: CLINIC | Age: 66
End: 2021-07-23
Payer: COMMERCIAL

## 2021-07-23 VITALS
SYSTOLIC BLOOD PRESSURE: 142 MMHG | DIASTOLIC BLOOD PRESSURE: 90 MMHG | HEIGHT: 67 IN | BODY MASS INDEX: 21.5 KG/M2 | WEIGHT: 137 LBS

## 2021-07-23 DIAGNOSIS — M81.0 OSTEOPOROSIS WITHOUT CURRENT PATHOLOGICAL FRACTURE, UNSPECIFIED OSTEOPOROSIS TYPE: ICD-10-CM

## 2021-07-23 DIAGNOSIS — Z12.31 VISIT FOR SCREENING MAMMOGRAM: ICD-10-CM

## 2021-07-23 DIAGNOSIS — Z01.419 ENCOUNTER FOR GYNECOLOGICAL EXAMINATION WITHOUT ABNORMAL FINDING: Primary | ICD-10-CM

## 2021-07-23 PROCEDURE — 77067 SCR MAMMO BI INCL CAD: CPT | Mod: TC | Performed by: RADIOLOGY

## 2021-07-23 PROCEDURE — 77063 BREAST TOMOSYNTHESIS BI: CPT | Mod: TC | Performed by: RADIOLOGY

## 2021-07-23 PROCEDURE — 99397 PER PM REEVAL EST PAT 65+ YR: CPT | Performed by: OBSTETRICS & GYNECOLOGY

## 2021-07-23 RX ORDER — ALENDRONATE SODIUM 70 MG/1
TABLET ORAL
Qty: 12 TABLET | Refills: 3 | Status: SHIPPED | OUTPATIENT
Start: 2021-07-23 | End: 2022-07-15

## 2021-07-23 RX ORDER — AMLODIPINE BESYLATE 2.5 MG/1
2.5 TABLET ORAL
COMMUNITY
Start: 2021-06-25

## 2021-07-23 ASSESSMENT — ANXIETY QUESTIONNAIRES
6. BECOMING EASILY ANNOYED OR IRRITABLE: SEVERAL DAYS
3. WORRYING TOO MUCH ABOUT DIFFERENT THINGS: SEVERAL DAYS
1. FEELING NERVOUS, ANXIOUS, OR ON EDGE: SEVERAL DAYS
2. NOT BEING ABLE TO STOP OR CONTROL WORRYING: SEVERAL DAYS
7. FEELING AFRAID AS IF SOMETHING AWFUL MIGHT HAPPEN: SEVERAL DAYS
IF YOU CHECKED OFF ANY PROBLEMS ON THIS QUESTIONNAIRE, HOW DIFFICULT HAVE THESE PROBLEMS MADE IT FOR YOU TO DO YOUR WORK, TAKE CARE OF THINGS AT HOME, OR GET ALONG WITH OTHER PEOPLE: NOT DIFFICULT AT ALL
5. BEING SO RESTLESS THAT IT IS HARD TO SIT STILL: NOT AT ALL
GAD7 TOTAL SCORE: 5

## 2021-07-23 ASSESSMENT — MIFFLIN-ST. JEOR: SCORE: 1176.12

## 2021-07-23 ASSESSMENT — PATIENT HEALTH QUESTIONNAIRE - PHQ9
5. POOR APPETITE OR OVEREATING: NOT AT ALL
SUM OF ALL RESPONSES TO PHQ QUESTIONS 1-9: 0

## 2021-07-23 NOTE — PATIENT INSTRUCTIONS
Follow up with your primary care provider for your other medical problems.  Continue self breast exam.  Increase physical activity and exercise.  Usual safety and preventative measures counseling done.  Last pap smear (2018) was normal.  No pap was obtained this year due to patient age per guidelines.  This was discussed with the patient and she agrees with the plan.  Discussed Osteoporosis screening as well as calcium and Vitamin D recommendations.  Will continue on fosamax and repeat bone scan next year.

## 2021-07-23 NOTE — PROGRESS NOTES
Jeniffer is a 66 year old  female who presents for annual exam.     Besides routine health maintenance, she has no other health concerns today .    HPI:  Here today for yearly exam --doing well.  Postmenopausal.  No vb/spotting.  No hot flashes or night sweats.  Not sexually active.  Denies dryness or pain.  No bowel/bladder issues.  Denies leaking or incontinence issues.  Not having nighttime issues    ; retired and loving it!  Spent 3 months in [place], [place] last winter --will rent again this winter; daughter doing well  -staying active with pickleball, golf, etc.  Has had some SI joint issues so taking it easy currently; working with [facility] and chiropractor; will likely see PT  +mammo today; +IMPLANTS  PCP -cannot remember name but seeing [facility] next month; added norvasc to HTN medications; will follow bloodwork, etc  +osteoporosis --has been on fosamax since [id number]; last bone scan stable in  (spine -0.9 L hip -2.1, R hip -2.7) --will repeat next year  -completed covid vaccine series  -colonoscopy up to date; last done in --will repeat in 7yrs  -has 50yr class reunion this fall!!          GYNECOLOGIC HISTORY:    No LMP recorded. Patient is postmenopausal.      Her current contraception method is: menopause and not sexually active.  She  reports that she has never smoked. She has never used smokeless tobacco.    Patient is not sexually active.  STD testing offered?  Declined  Last PHQ-9 score on record =   PHQ-9 SCORE 2020   PHQ-9 Total Score 1     Last GAD7 score on record =   MENA-7 SCORE 2020   Total Score 5     Alcohol Score = 4    HEALTH MAINTENANCE:  Cholesterol: (No results found for: CHOL   Last Mammo: One year ago, Result: Normal, Next Mammo: Today   Pap:   Lab Results   Component Value Date    PAP NIL 04/10/2018   4/10/2018 WNL HPV (-)neg  Colonoscopy:  10/7/2020, Result: Normal, Next Colonoscopy: 7 years.  Dexa:  2020    Health maintenance updated:   yes    HISTORY:  OB History    Para Term  AB Living   1 1 1 0 0 1   SAB TAB Ectopic Multiple Live Births   0 0 0 0 1      # Outcome Date GA Lbr Van/2nd Weight Sex Delivery Anes PTL Lv   1 Term 93   2.325 kg (5 lb 2 oz) F  EPI  IVAN      Name: Milvia       Patient Active Problem List   Diagnosis     Osteoporosis     Hypertension, essential     Family history of nonmelanoma skin cancer     History of actinic keratoses     Colon polyp     Adenomatous polyps     H/O colonoscopy     Past Surgical History:   Procedure Laterality Date     ENDOMETRIAL SAMPLING (BIOPSY)  2006    PMB-->path: benign     FOOT SURGERY       HAND SURGERY      thumb     HC ABLATION, ENDOMETRIAL, THERMAL, W/O HYSTEROSCOPIC GUIDANCE  2004    ([facility])     MAMMOPLASTY AUGMENTATION Bilateral     saline implants      Social History     Tobacco Use     Smoking status: Never Smoker     Smokeless tobacco: Never Used   Substance Use Topics     Alcohol use: Yes     Alcohol/week: 0.0 standard drinks      Problem (# of Occurrences) Relation (Name,Age of Onset)    Breast Cancer (2) Sister (52), Paternal Grandmother    Colon Cancer (1) Father    Diabetes (1) Mother    Fractures (1) Mother: Hip    Heart Disease (1) Brother    Hyperlipidemia (2) Father, Mother    Hypertension (2) Father, Mother    No Known Problems (1) Other    Osteoporosis (3) Mother, Maternal Grandmother, Maternal Grandfather    Parkinsonism (2) Brother, Brother            Current Outpatient Medications   Medication Sig     alendronate (FOSAMAX) 70 MG tablet TAKE 1 TABLET EVERY 7 DAYS     amLODIPine (NORVASC) 2.5 MG tablet Take 2.5 mg by mouth     Ascorbic Acid (VITAMIN C PO)      atorvastatin (LIPITOR) 10 MG tablet Take 10 mg by mouth     desonide (DESOWEN) 0.05 % external cream APPLY SPARINGLY TO AFFECTED AREA TWO TIMES PER DAY FOR NO MORE THEN 3 -5 CONSECUTIVE DAYS     lisinopril (PRINIVIL/ZESTRIL) 5 MG tablet Take 10 mg by mouth 2 times daily      LORazepam  "(ATIVAN) 1 MG tablet Take 1 mg by mouth     MAGNESIUM OXIDE PO      Multiple Vitamins-Minerals (MULTIVITAMIN PO)      Wheat Dextrin (BENEFIBER PO)      No current facility-administered medications for this visit.     Allergies   Allergen Reactions     Codeine Nausea     Oxycodone-Acetaminophen      PN: LW Reaction: GI Upset       Past medical, surgical, social and family histories were reviewed and updated in EPIC.    ROS:   12 point review of systems negative other than symptoms noted below or in the HPI.  No urinary frequency or dysuria, bladder or kidney problems    EXAM:  BP (!) 142/90   Ht 1.689 m (5' 6.5\")   Wt 62.1 kg (137 lb)   Breastfeeding No   BMI 21.78 kg/m     BMI: Body mass index is 21.78 kg/m .    PHYSICAL EXAM:  Constitutional:   Appearance: Well nourished, well developed, alert, in no acute distress  Neck:  Lymph Nodes:  No lymphadenopathy present    Thyroid:  Gland size normal, nontender, no nodules or masses present  on palpation  Chest:  Respiratory Effort:  Breathing unlabored  Cardiovascular:    Heart: Auscultation:  Regular rate, normal rhythm, no murmurs present  Breasts: Inspection of Breasts:  No lymphadenopathy present., Palpation of Breasts and Axillae:  No masses present on palpation, no breast tenderness., Axillary Lymph Nodes:  No lymphadenopathy present., No nodularity, asymmetry or nipple discharge bilaterally. and IMPLANTS  Gastrointestinal:   Abdominal Examination:  Abdomen nontender to palpation, tone normal without rigidity or guarding, no masses present, umbilicus without lesions   Liver and Spleen:  No hepatomegaly present, liver nontender to palpation    Hernias:  No hernias present  Lymphatic: Lymph Nodes:  No other lymphadenopathy present  Skin:  General Inspection:  No rashes present, no lesions present, no areas of  discoloration  Neurologic:    Mental Status:  Oriented X3.  Normal strength and tone, sensory exam                grossly normal, mentation intact and " speech normal.    Psychiatric:   Mentation appears normal and affect normal/bright.         Pelvic Exam:  External Genitalia:     Normal appearance for age, no discharge present, no tenderness present, no inflammatory lesions present, color normal  Vagina:     Normal vaginal vault without central or paravaginal defects, ATROPHIC  Bladder:     Nontender to palpation  Urethra:   Urethral Body:  Urethra palpation normal, urethra structural support normal   Urethral Meatus:  No erythema or lesions present  Cervix:     Appearance healthy, no lesions present, nontender to palpation, no bleeding present  Uterus:     Nontender to palpation, no masses present, position anteflexed, mobility: normal  Adnexa:     No adnexal tenderness present, no adnexal masses present  Perineum:     Perineum within normal limits, no evidence of trauma, no rashes or skin lesions present  Inguinal Lymph Nodes:     No lymphadenopathy present      COUNSELING:   Reviewed preventive health counseling, as reflected in patient instructions  Special attention given to:        Regular exercise       Healthy diet/nutrition       Osteoporosis prevention/bone health    BMI: Body mass index is 21.78 kg/m .      ASSESSMENT:  66 year old female with satisfactory annual exam.    ICD-10-CM    1. Encounter for gynecological examination without abnormal finding  Z01.419    2. Osteoporosis without current pathological fracture, unspecified osteoporosis type  M81.0 alendronate (FOSAMAX) 70 MG tablet       PLAN:  Patient Instructions   Follow up with your primary care provider for your other medical problems.  Continue self breast exam.  Increase physical activity and exercise.  Usual safety and preventative measures counseling done.  Last pap smear (2018) was normal.  No pap was obtained this year due to patient age per guidelines.  This was discussed with the patient and she agrees with the plan.  Discussed Osteoporosis screening as well as calcium and Vitamin D  recommendations.  Will continue on fosamax and repeat bone scan next year.      Terra Fraser MD

## 2021-07-24 ASSESSMENT — ANXIETY QUESTIONNAIRES: GAD7 TOTAL SCORE: 5

## 2021-10-24 ENCOUNTER — HEALTH MAINTENANCE LETTER (OUTPATIENT)
Age: 66
End: 2021-10-24

## 2022-06-09 NOTE — LETTER
[address]  [address]  [phone]        9/3/2020       Jeniffer Meyer  [address] [place] [address]      Dear Jeniffer:    You are scheduled for Mohs Surgery on: Thursday, October 22, 2020 at 8:00 am.    Please check in at [facility], [address].     You don't need to arrive more than 5-10 minutes prior to your appointment time.     In order to keep everyone safe and healthy we ask that:  Please wear a mask to the appointment-one will be provided if you do not have one  No visitors are alowed in the clinic- patients only    If you have an COVID symptoms (fever/cough/body aches/sore throat/temperature) or have been exposed to a COVID positive patient with in the last 14 days, please reschedule this appointment to a later date.  Be sure to eat a good breakfast and bathe and wash your hair prior to surgery.     If you are taking any anti-coagulants that are prescribed by your Doctor (such as Coumadin/Warfarin, Plavix, Aspirin, Ibuprofen), please continue taking them.     However, if you are taking anti-coagulants over the counter without a Doctor's order for a medical condition, please discontinue them 10 days prior to surgery.     Please wear loose comfortable clothing as it could possibly be 4-6 hours until your surgery is completed depending upon how many layers of tissue need to be removed.      Thank you,    BRIDGETTE Navarro MD   Yes

## 2022-07-15 DIAGNOSIS — M81.0 OSTEOPOROSIS WITHOUT CURRENT PATHOLOGICAL FRACTURE, UNSPECIFIED OSTEOPOROSIS TYPE: ICD-10-CM

## 2022-07-15 RX ORDER — ALENDRONATE SODIUM 70 MG/1
TABLET ORAL
Qty: 12 TABLET | Refills: 0 | Status: SHIPPED | OUTPATIENT
Start: 2022-07-15 | End: 2023-08-01

## 2022-07-15 NOTE — TELEPHONE ENCOUNTER
"Requested Prescriptions   Pending Prescriptions Disp Refills     alendronate (FOSAMAX) 70 MG tablet [Pharmacy Med Name: ALENDRONATE SODIUM TABS 4'S 70MG] 12 tablet 3     Sig: TAKE 1 TABLET EVERY 7 DAYS       Bisphosphonates Failed - 7/15/2022 12:18 AM        Failed - Normal serum creatinine on file within past 12 months     No lab results found.    Ok to refill medication if creatinine is low          Passed - Recent (12 mo) or future (30 days) visit within the authorizing provider's specialty     Patient has had an office visit with the authorizing provider or a provider within the authorizing providers department within the previous 12 mos or has a future within next 30 days. See \"Patient Info\" tab in inbasket, or \"Choose Columns\" in Meds & Orders section of the refill encounter.              Passed - Dexa on file within past 2 years     Please review last Dexa result.           Passed - Medication is active on med list        Passed - Patient is age 18 or older           Last Written Prescription Date:  7/23/21  Last Fill Quantity: 12,  # refills: 3   Last office visit: 7/23/2021 with prescribing provider:  Evelio   Future Office Visit:   Next 5 appointments (look out 90 days)    Jul 28, 2022 10:00 AM  PHYSICAL with Terra Fraser MD  [facility] [address] [place] [address] [phone]           Medication is being filled for 1 time refill only due to:  Patient needs to be seen because due for annual.  Appointment scheduled.  Maya Marley RN on 7/15/2022 at 5:29 AM          "

## 2022-07-28 ENCOUNTER — ANCILLARY PROCEDURE (OUTPATIENT)
Dept: MAMMOGRAPHY | Facility: CLINIC | Age: 67
End: 2022-07-28
Payer: COMMERCIAL

## 2022-07-28 ENCOUNTER — OFFICE VISIT (OUTPATIENT)
Dept: OBGYN | Facility: CLINIC | Age: 67
End: 2022-07-28
Payer: COMMERCIAL

## 2022-07-28 ENCOUNTER — ANCILLARY PROCEDURE (OUTPATIENT)
Dept: BONE DENSITY | Facility: CLINIC | Age: 67
End: 2022-07-28
Payer: COMMERCIAL

## 2022-07-28 VITALS
BODY MASS INDEX: 21.35 KG/M2 | WEIGHT: 136 LBS | SYSTOLIC BLOOD PRESSURE: 146 MMHG | HEIGHT: 67 IN | DIASTOLIC BLOOD PRESSURE: 92 MMHG

## 2022-07-28 DIAGNOSIS — Z78.0 ASYMPTOMATIC POSTMENOPAUSAL STATE: ICD-10-CM

## 2022-07-28 DIAGNOSIS — Z01.419 ENCOUNTER FOR GYNECOLOGICAL EXAMINATION WITHOUT ABNORMAL FINDING: Primary | ICD-10-CM

## 2022-07-28 DIAGNOSIS — Z12.31 VISIT FOR SCREENING MAMMOGRAM: ICD-10-CM

## 2022-07-28 DIAGNOSIS — N81.11 CYSTOCELE, MIDLINE: ICD-10-CM

## 2022-07-28 DIAGNOSIS — M81.0 OSTEOPOROSIS WITHOUT CURRENT PATHOLOGICAL FRACTURE, UNSPECIFIED OSTEOPOROSIS TYPE: ICD-10-CM

## 2022-07-28 PROCEDURE — 77063 BREAST TOMOSYNTHESIS BI: CPT | Mod: TC | Performed by: RADIOLOGY

## 2022-07-28 PROCEDURE — 77067 SCR MAMMO BI INCL CAD: CPT | Mod: TC | Performed by: RADIOLOGY

## 2022-07-28 PROCEDURE — 99214 OFFICE O/P EST MOD 30 MIN: CPT | Performed by: OBSTETRICS & GYNECOLOGY

## 2022-07-28 PROCEDURE — 77080 DXA BONE DENSITY AXIAL: CPT | Performed by: OBSTETRICS & GYNECOLOGY

## 2022-07-28 RX ORDER — ALENDRONATE SODIUM 70 MG/1
TABLET ORAL
Qty: 12 TABLET | Refills: 4 | Status: CANCELLED | OUTPATIENT
Start: 2022-07-28

## 2022-07-28 RX ORDER — VITAMIN B COMPLEX
TABLET ORAL DAILY
COMMUNITY

## 2022-07-28 ASSESSMENT — ANXIETY QUESTIONNAIRES
3. WORRYING TOO MUCH ABOUT DIFFERENT THINGS: SEVERAL DAYS
IF YOU CHECKED OFF ANY PROBLEMS ON THIS QUESTIONNAIRE, HOW DIFFICULT HAVE THESE PROBLEMS MADE IT FOR YOU TO DO YOUR WORK, TAKE CARE OF THINGS AT HOME, OR GET ALONG WITH OTHER PEOPLE: NOT DIFFICULT AT ALL
6. BECOMING EASILY ANNOYED OR IRRITABLE: NOT AT ALL
7. FEELING AFRAID AS IF SOMETHING AWFUL MIGHT HAPPEN: NOT AT ALL
GAD7 TOTAL SCORE: 3
2. NOT BEING ABLE TO STOP OR CONTROL WORRYING: SEVERAL DAYS
1. FEELING NERVOUS, ANXIOUS, OR ON EDGE: SEVERAL DAYS
GAD7 TOTAL SCORE: 3
5. BEING SO RESTLESS THAT IT IS HARD TO SIT STILL: NOT AT ALL

## 2022-07-28 ASSESSMENT — PATIENT HEALTH QUESTIONNAIRE - PHQ9
SUM OF ALL RESPONSES TO PHQ QUESTIONS 1-9: 1
5. POOR APPETITE OR OVEREATING: NOT AT ALL

## 2022-07-28 NOTE — PATIENT INSTRUCTIONS
Follow up with your primary care provider for your other medical problems.  Continue self breast exam.  Increase physical activity and exercise.  Usual safety and preventative measures counseling done.  Last pap smear was normal.  No pap was obtained this year due to patient age per guidelines.  This was discussed with the patient and she agrees with the plan.  Discussed Osteoporosis screening as well as calcium and Vitamin D recommendations.  Preliminary results of bone scan discussed today with significant improvement in all areas.  Will take drug holiday from her fosamax and repeat bone scan in 2 years.  Will continue working on kegel exercises for mild prolapse.  Midline cystocele discussed in detail.  Will continue to observe at this time.

## 2022-07-28 NOTE — LETTER
[facility] [address] [address]  Phone: [phone]  Fax: [phone]      Jeniffer Meyer     Date:  7/31/2022   [address] [place] [address]      Thank you for having your DEXA scan performed.  As you recall, the DEXA scan evaluates the strength of your bones.  It is important to know the strength of your bones to help you avoid breaking bones in the future.    A T score shows your risk for breaking a bone.  Normal bone has a T score of -0.9 or higher.  Some bone thinning (osteopenia) has a T score between -1.0 and -2.4.  A lot of bone thinning (osteoporosis) has a T score of -2.5 or lower.    Your T scores on 7/28/2022 were:     Left Hip:  -1.5 score    Right Hip:  -1.5 score    This T score shows you have some thinning (Osteopenia) Compared with your previous scan, this shows improvement.    Spine:  +0.4 score      This T score shows you have normal bone Compared with your previous scan, this shows improvement.      The good news is that there are treatments for making your bones stronger, if you need them.    Ways you can make your bones stronger:    Weight bearing exercises such as walking.  Eat three servings of dairy a day, or take calcium each day (5384-2529 mg).  Take Vitamin D each day (1,000-2,000 IU).    For more information, please make an appointment with your Primary Care Provider, request a brochure from our office, or look on our website and go to the Bone Density page.     No need to make an appointment.    Jeniffer, continue Vitamin D, Calcium and exercise.    I'm thrilled with the improvement in your bone scores as we discussed last week!  We'll take a break from your fosamax and plan to repeat your dexa in 2 years.  Have a great year!    Thank you.    Terra Fraser MD

## 2022-07-28 NOTE — PROGRESS NOTES
"  Jenfifer is a 67 year old  female who presents for GYN exam.     Besides routine health maintenance,  she would like to discuss vaginal prolapse.    Do you have a Health Care Directive?: Yes, advance care planning is on file.    HPI:  Here today for limited exam (has yearly with PCP next month) and would like to discuss bone scan results as well as possible prolapse symptoms.  Postmenopausal.  No vb/spotting.  Denies any significant hot flushes or night sweats.  Not currently SA due to prostate issues for her  this year.  No vaginal dryness or pain.  Has noticed episodes of vaginal fullness.  Almost had the sensation of \"having a tampax\" in place.  No pain or discomfort but fullness.  Did not feel or look inside.  Very intermittent and not affecting day to day activities.  No bowel/bladder issues.  No leaking or incontinence.  Has been better about regular bladder empyting.  Doing kegels.  Using benefiber to help keep regular.  No straining or constipation.    ; retired 2yrs ago from [employer]; has been caregiver for  with recent prostate issues; has always been helping with mother in law; 1 daughter  -staying active; did better while in [place] this winter with pickleball and golf but has not been able to play as much with busy summer; staying active in general  +mammo today; +implants  PCP -Dr. [facility]eda [facility]; follows meds, bloodwork, etc; sees Dr. Martin for regular skin checks  -hx osteoporosis; has been on fosamax since 2014; had repeat scan today with much improvement in all areas!!  Spine +0.4 (-0.9), L hip -1.5 (-2.1) and R hip -1.5 (-2.7); has not changed anything drastic with supplements, etc; open to a drug holiday and repeat bone scan in 2yrs    GYNECOLOGIC HISTORY:  No LMP recorded. Patient is postmenopausal..   reports that she has quit smoking. Her smoking use included cigarettes. She has never used smokeless tobacco.    Patient is not sexually active.  STD " testing offered?  Declined  Last PHQ-9 score on record=   PHQ-9 SCORE 2022   PHQ-9 Total Score 1     Last GAD7 score on record=   MENA-7 SCORE 2020   Total Score 5 5 3     Alcohol Score = 4    HEALTH MAINTENANCE:  Cholesterol: (No results found for: CHOL   Last Mammo: One year ago, Result: Normal, Next Mammo: Today   Pap:   Lab Results   Component Value Date    PAP NIL 04/10/2018    DEXA:  , and today  Colonoscopy:  10/7/2020, Result:  Normal, Next Colonoscopy: 2027 years.    Health maintenance updated:  yes    HISTORY:  OB History    Para Term  AB Living   1 1 1 0 0 1   SAB IAB Ectopic Multiple Live Births   0 0 0 0 1      # Outcome Date GA Lbr Van/2nd Weight Sex Delivery Anes PTL Lv   1 Term 93   2.325 kg (5 lb 2 oz) F  EPI  IVAN      Name: Milvia     Patient Active Problem List   Diagnosis     Osteoporosis     Hypertension, essential     Family history of nonmelanoma skin cancer     History of actinic keratoses     Colon polyp     Adenomatous polyps     H/O colonoscopy     Past Surgical History:   Procedure Laterality Date     ENDOMETRIAL SAMPLING (BIOPSY)  2006    PMB-->path: benign     FOOT SURGERY       HAND SURGERY      thumb     HC ABLATION, ENDOMETRIAL, THERMAL, W/O HYSTEROSCOPIC GUIDANCE  2004    (Sondra)     MAMMOPLASTY AUGMENTATION Bilateral     saline implants      Social History     Tobacco Use     Smoking status: Former Smoker     Types: Cigarettes     Smokeless tobacco: Never Used   Substance Use Topics     Alcohol use: Yes     Alcohol/week: 0.0 standard drinks      Problem (# of Occurrences) Relation (Name,Age of Onset)    Breast Cancer (2) Sister (52), Paternal Grandmother    Colon Cancer (1) Father    Diabetes (1) Mother    Fractures (1) Mother: Hip    Heart Disease (1) Brother    Hyperlipidemia (2) Mother, Father    Hypertension (2) Mother, Father    No Known Problems (4) Brother, Brother, Paternal Grandfather, Other    Osteoporosis (3)  "Mother, Maternal Grandmother, Maternal Grandfather    Parkinsonism (2) Brother, Brother            Current Outpatient Medications   Medication Sig     alendronate (FOSAMAX) 70 MG tablet TAKE 1 TABLET EVERY 7 DAYS     amLODIPine (NORVASC) 2.5 MG tablet Take 2.5 mg by mouth     Ascorbic Acid (VITAMIN C PO)      atorvastatin (LIPITOR) 10 MG tablet Take 10 mg by mouth     desonide (DESOWEN) 0.05 % external cream APPLY SPARINGLY TO AFFECTED AREA TWO TIMES PER DAY FOR NO MORE THEN 3 -5 CONSECUTIVE DAYS     lisinopril (PRINIVIL/ZESTRIL) 5 MG tablet Take 10 mg by mouth 2 times daily      LORazepam (ATIVAN) 1 MG tablet Take 1 mg by mouth     MAGNESIUM OXIDE PO      Multiple Vitamins-Minerals (MULTIVITAMIN PO)      Vitamin D3 (CHOLECALCIFEROL) 25 mcg (1000 units) tablet Take by mouth daily     Wheat Dextrin (BENEFIBER PO)      methylsulfonylmethane (MSM) 1000 MG CAPS capsule Take 1 capsule by mouth     No current facility-administered medications for this visit.       Allergies   Allergen Reactions     Codeine Nausea     Oxycodone-Acetaminophen      PN: LW Reaction: GI Upset       Past medical, surgical, social and family history were reviewed and updated in EPIC.    ROS:   12 point review of systems negative other than symptoms noted below or in the HPI.  No urinary frequency or dysuria, bladder or kidney problems    EXAM:  BP (!) 146/92   Ht 1.689 m (5' 6.5\")   Wt 61.7 kg (136 lb)   BMI 21.62 kg/m     BMI: Body mass index is 21.62 kg/m .    EXAM:  Constitutional: Appearance: Well nourished, well developed alert, in no acute distress  Neck:  Lymph Nodes:  No lymphadenopathy present    Thyroid:  Gland size normal, nontender, no nodules or masses present  on palpation  Chest:  Respiratory Effort:  Breathing unlabored  Cardiovascular:Heart    Auscultation:  Regular rate, normal rhythm, no murmurs present  Breasts: Palpation of Breasts and Axillae:  No masses present on palpation, no breast tenderness., Axillary Lymph Nodes:  " No lymphadenopathy present., No nodularity, asymmetry or nipple discharge bilaterally. and +IMPLANTS  Gastrointestinal:  Abdominal Examination:  Abdomen nontender to palpation, tone normal without     rigidity or guarding, no masses present, umbilicus without lesions    Liver and speen:  No hepatomegaly present, liver nontender to palpation    Hernias:  No hernias present  Lymphatic: Lymph Nodes:  No other lymphadenopathy present  Skin:  General Inspection:  No rashes present, no lesions present, no areas of  discoloration.    Genitalia and Groin:  No rashes present, no lesions present, no areas of  discoloration, no masses present  Neurologic/Psychiatric:    Mental Status:  Oriented X3     Pelvic Exam:  External Genitalia:     Normal appearance for age, no discharge present, no tenderness present, no inflammatory lesions present, color normal  Vagina:     Normal vaginal vault without central or paravaginal defects, ATROPHIC; STAGE I-II CYSTOCELE  Bladder:     Nontender to palpation  Urethra:   Urethral Body:  Urethra palpation normal, urethra structural support normal   Urethral Meatus:  No erythema or lesions present  Cervix:     Appearance healthy, no lesions present, nontender to palpation, no bleeding present  Uterus:     Nontender to palpation, no masses present, position anteflexed, mobility: normal  Adnexa:     No adnexal tenderness present, no adnexal masses present  Perineum:     Perineum within normal limits, no evidence of trauma, no rashes or skin lesions present  Inguinal Lymph Nodes:     No lymphadenopathy present      COUNSELING:   Reviewed preventive health counseling, as reflected in patient instructions  Special attention given to:       Regular exercise       Healthy diet/nutrition       Osteoporosis prevention/bone health       Colon cancer screening       (Jyoti)menopause management    BMI:  Body mass index is 21.62 kg/m .     reports that she has quit smoking. Her smoking use included  cigarettes. She has never used smokeless tobacco.      ASSESSMENT:  67 year old female with satisfactory annual exam.    ICD-10-CM    1. Encounter for gynecological examination without abnormal finding  Z01.419    2. Osteoporosis without current pathological fracture, unspecified osteoporosis type  M81.0    3. Cystocele, midline  N81.11        PLAN:  Patient Instructions   Follow up with your primary care provider for your other medical problems.  Continue self breast exam.  Increase physical activity and exercise.  Usual safety and preventative measures counseling done.  Last pap smear was normal.  No pap was obtained this year due to patient age per guidelines.  This was discussed with the patient and she agrees with the plan.  Discussed Osteoporosis screening as well as calcium and Vitamin D recommendations.  Preliminary results of bone scan discussed today with significant improvement in all areas.  Will take drug holiday from her fosamax and repeat bone scan in 2 years.  Will continue working on kegel exercises for mild prolapse.  Midline cystocele discussed in detail.  Will continue to observe at this time.       Terra Fraser MD

## 2022-10-15 ENCOUNTER — HEALTH MAINTENANCE LETTER (OUTPATIENT)
Age: 67
End: 2022-10-15

## 2023-08-01 ENCOUNTER — ANCILLARY PROCEDURE (OUTPATIENT)
Dept: MAMMOGRAPHY | Facility: CLINIC | Age: 68
End: 2023-08-01
Payer: COMMERCIAL

## 2023-08-01 ENCOUNTER — OFFICE VISIT (OUTPATIENT)
Dept: OBGYN | Facility: CLINIC | Age: 68
End: 2023-08-01
Payer: COMMERCIAL

## 2023-08-01 VITALS
HEIGHT: 66 IN | SYSTOLIC BLOOD PRESSURE: 138 MMHG | BODY MASS INDEX: 22.18 KG/M2 | WEIGHT: 138 LBS | DIASTOLIC BLOOD PRESSURE: 90 MMHG

## 2023-08-01 DIAGNOSIS — Z12.31 VISIT FOR SCREENING MAMMOGRAM: ICD-10-CM

## 2023-08-01 DIAGNOSIS — M81.0 OSTEOPOROSIS WITHOUT CURRENT PATHOLOGICAL FRACTURE, UNSPECIFIED OSTEOPOROSIS TYPE: ICD-10-CM

## 2023-08-01 DIAGNOSIS — Z01.419 ENCOUNTER FOR GYNECOLOGICAL EXAMINATION WITHOUT ABNORMAL FINDING: Primary | ICD-10-CM

## 2023-08-01 PROCEDURE — 77063 BREAST TOMOSYNTHESIS BI: CPT | Mod: TC | Performed by: RADIOLOGY

## 2023-08-01 PROCEDURE — 77067 SCR MAMMO BI INCL CAD: CPT | Mod: TC | Performed by: RADIOLOGY

## 2023-08-01 PROCEDURE — 99214 OFFICE O/P EST MOD 30 MIN: CPT | Performed by: OBSTETRICS & GYNECOLOGY

## 2023-08-01 ASSESSMENT — ANXIETY QUESTIONNAIRES
IF YOU CHECKED OFF ANY PROBLEMS ON THIS QUESTIONNAIRE, HOW DIFFICULT HAVE THESE PROBLEMS MADE IT FOR YOU TO DO YOUR WORK, TAKE CARE OF THINGS AT HOME, OR GET ALONG WITH OTHER PEOPLE: NOT DIFFICULT AT ALL
2. NOT BEING ABLE TO STOP OR CONTROL WORRYING: SEVERAL DAYS
6. BECOMING EASILY ANNOYED OR IRRITABLE: SEVERAL DAYS
1. FEELING NERVOUS, ANXIOUS, OR ON EDGE: SEVERAL DAYS
GAD7 TOTAL SCORE: 5
3. WORRYING TOO MUCH ABOUT DIFFERENT THINGS: SEVERAL DAYS
GAD7 TOTAL SCORE: 5
7. FEELING AFRAID AS IF SOMETHING AWFUL MIGHT HAPPEN: SEVERAL DAYS
5. BEING SO RESTLESS THAT IT IS HARD TO SIT STILL: NOT AT ALL

## 2023-08-01 ASSESSMENT — PATIENT HEALTH QUESTIONNAIRE - PHQ9
SUM OF ALL RESPONSES TO PHQ QUESTIONS 1-9: 1
5. POOR APPETITE OR OVEREATING: NOT AT ALL

## 2023-08-01 NOTE — PROGRESS NOTES
Jeniffer is a 68 year old  female who presents for annual exam.     Besides routine health maintenance, she has no other health concerns today .    Do you have a Health Care Directive?: Yes, advance care planning is on file.    HPI:  Here today for GYN yearly exam --sees Dr. Mosqueda for primary cares.  Postmenopausal.  No vb/spotting.  Denies any significant hot flushes or night sweats.  Not currently SA due to 's medical issues.  Denies vaginal dryness or pain.  Can still appreciate mild vaginal fullness at times but no prohibiting her from any activities.  Trying to remember kegels.  No bowel issues --uses daily benefiber.  No bladder issues.  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 Kyle Quinn --follows bloodwork, meds, etc  -up to date on colonoscopy  -hx osteoporosis --currently on fosamax holiday; bone scan last year with spine +0.4, L hip -1.5, R hip -1.5    GYNECOLOGIC HISTORY:  No LMP recorded. Patient is postmenopausal..   reports that she has quit smoking. Her smoking use included cigarettes. She has never used smokeless tobacco.    Patient is not sexually active.  STD testing offered?  Declined  Last PHQ-9 score on record=       2023    11:04 AM   PHQ-9 SCORE   PHQ-9 Total Score 1     Last GAD7 score on record=       2021    11:16 AM 2022    10:08 AM 2023    11:04 AM   MENA-7 SCORE   Total Score 5 3 5     Alcohol Score = 4    HEALTH MAINTENANCE:  Cholesterol: (No results found for: CHOL   Last Mammo: One year ago, Result: Normal, Next Mammo: Today   Pap:   Lab Results   Component Value Date    PAP NIL 04/10/2018      DEXA:  2022-stopped fosamax  FINDINGS:               Lumbar Spine (L1-L4)      T-score:  0.4               Left Femoral Neck            T-score:   -1.5               Right Femoral Neck          T-score:  -1.5                           Lumbar (L1-L4) BMD: 1.242  Previous: 1.081                          Total Hip Mean BMD: 0.810  Previous: 0.748  Colonoscopy:  10/2020, Result:  Normal, Next Colonoscopy: 4 more years.    Health maintenance updated:  yes    HISTORY:  OB History    Para Term  AB Living   1 1 1 0 0 1   SAB IAB Ectopic Multiple Live Births   0 0 0 0 1      # Outcome Date GA Lbr Van/2nd Weight Sex Delivery Anes PTL Lv   1 Term 93   2.325 kg (5 lb 2 oz) F  EPI  IVAN      Name: Milvia     Patient Active Problem List   Diagnosis    Osteoporosis    Hypertension, essential    Family history of nonmelanoma skin cancer    History of actinic keratoses    Colon polyp    Adenomatous polyps    H/O colonoscopy     Past Surgical History:   Procedure Laterality Date    ENDOMETRIAL SAMPLING (BIOPSY)  2006    PMB-->path: benign    FOOT SURGERY      HAND SURGERY      thumb    HC ABLATION, ENDOMETRIAL, THERMAL, W/O HYSTEROSCOPIC GUIDANCE  2004    (Sondra)    MAMMOPLASTY AUGMENTATION Bilateral     saline implants      Social History     Tobacco Use    Smoking status: Former     Types: Cigarettes    Smokeless tobacco: Never   Substance Use Topics    Alcohol use: Yes     Alcohol/week: 0.0 standard drinks of alcohol      Problem (# of Occurrences) Relation (Name,Age of Onset)    Parkinsonism (2) Brother, Brother    Diabetes (1) Mother    Heart Disease (1) Brother    Hypertension (2) Mother, Father    Osteoporosis (3) Mother, Maternal Grandmother, Maternal Grandfather    Breast Cancer (2) Sister (52), Paternal Grandmother    Hyperlipidemia (2) Mother, Father    Other - See Comments (1) Other (): mild cognitive impairment    Colon Cancer (1) Father    Fractures (1) Mother: Hip    No Known Problems (3) Brother, Brother, Paternal Grandfather              Current Outpatient Medications   Medication Sig    amLODIPine (NORVASC) 2.5 MG tablet  "Take 2.5 mg by mouth    Ascorbic Acid (VITAMIN C PO)     atorvastatin (LIPITOR) 10 MG tablet Take 10 mg by mouth    desonide (DESOWEN) 0.05 % external cream APPLY SPARINGLY TO AFFECTED AREA TWO TIMES PER DAY FOR NO MORE THEN 3 -5 CONSECUTIVE DAYS    lisinopril (PRINIVIL/ZESTRIL) 5 MG tablet Take 10 mg by mouth 2 times daily     LORazepam (ATIVAN) 1 MG tablet Take 1 mg by mouth    MAGNESIUM OXIDE PO     Multiple Vitamins-Minerals (MULTIVITAMIN PO)     Vitamin D3 (CHOLECALCIFEROL) 25 mcg (1000 units) tablet Take by mouth daily    Wheat Dextrin (BENEFIBER PO)      No current facility-administered medications for this visit.       Allergies   Allergen Reactions    Codeine Nausea    Oxycodone-Acetaminophen      PN: LW Reaction: GI Upset       Past medical, surgical, social and family history were reviewed and updated in EPIC.    ROS:   12 point review of systems negative other than symptoms noted below or in the HPI.  No urinary frequency or dysuria, bladder or kidney problems    EXAM:  BP (!) 138/90   Ht 1.683 m (5' 6.25\")   Wt 62.6 kg (138 lb)   BMI 22.11 kg/m     BMI: Body mass index is 22.11 kg/m .    EXAM:  Constitutional: Appearance: Well nourished, well developed alert, in no acute distress  Neck:  Lymph Nodes:  No lymphadenopathy present    Thyroid:  Gland size normal, nontender, no nodules or masses present  on palpation  Chest:  Respiratory Effort:  Breathing unlabored  Cardiovascular:Heart    Auscultation:  Regular rate, normal rhythm, no murmurs present  Breasts: Palpation of Breasts and Axillae:  No masses present on palpation, no breast tenderness., Axillary Lymph Nodes:  No lymphadenopathy present., No nodularity, asymmetry or nipple discharge bilaterally., and +IMPLANTS BILATERALLY; R>L FIRMNESS  Gastrointestinal:  Abdominal Examination:  Abdomen nontender to palpation, tone normal without     rigidity or guarding, no masses present, umbilicus without lesions    Liver and speen:  No hepatomegaly " present, liver nontender to palpation    Hernias:  No hernias present  Lymphatic: Lymph Nodes:  No other lymphadenopathy present  Skin:  General Inspection:  No rashes present, no lesions present, no areas of  discoloration.    Genitalia and Groin:  No rashes present, no lesions present, no areas of  discoloration, no masses present  Neurologic/Psychiatric:    Mental Status:  Oriented X3     Pelvic Exam:  External Genitalia:     Normal appearance for age, no discharge present, no tenderness present, no inflammatory lesions present, color normal  Vagina:     Normal vaginal vault without central or paravaginal defects, ATROPHIC  Bladder:     Nontender to palpation  Urethra:   Urethral Body:  Urethra palpation normal, urethra structural support normal   Urethral Meatus:  No erythema or lesions present  Cervix:     Appearance healthy, no lesions present, nontender to palpation, no bleeding present  Uterus:     Nontender to palpation, no masses present, position anteflexed, mobility: normal  Adnexa:     No adnexal tenderness present, no adnexal masses present  Perineum:     Perineum within normal limits, no evidence of trauma, no rashes or skin lesions present  Inguinal Lymph Nodes:     No lymphadenopathy present    COUNSELING:   Reviewed preventive health counseling, as reflected in patient instructions  Special attention given to:       Regular exercise       Healthy diet/nutrition       Osteoporosis prevention/bone health       (Jyoti)menopause management    BMI:  Body mass index is 22.11 kg/m .  Weight management plan noted, stable and monitoring   reports that she has quit smoking. Her smoking use included cigarettes. She has never used smokeless tobacco.      ASSESSMENT:  68 year old female with satisfactory annual exam.    ICD-10-CM    1. Encounter for gynecological examination without abnormal finding  Z01.419           PLAN:  Patient Instructions   Follow up with your primary care provider for your other medical  problems.  Continue self breast exam.  Increase physical activity and exercise.  Usual safety and preventative measures counseling done.  Last pap smear (2018) was normal.  No pap was obtained this year due to patient age per guidelines.  This was discussed with the patient and she agrees with the plan.  Discussed Osteoporosis screening as well as calcium and Vitamin D recommendations.  Will plan to repeat bone scan next year due to history of osteoporosis/penia.       Terra Fraser MD

## 2023-08-01 NOTE — PATIENT INSTRUCTIONS
Follow up with your primary care provider for your other medical problems.  Continue self breast exam.  Increase physical activity and exercise.  Usual safety and preventative measures counseling done.  Last pap smear (2018) was normal.  No pap was obtained this year due to patient age per guidelines.  This was discussed with the patient and she agrees with the plan.  Discussed Osteoporosis screening as well as calcium and Vitamin D recommendations.  Will plan to repeat bone scan next year due to history of osteoporosis/penia.

## 2023-10-29 ENCOUNTER — HEALTH MAINTENANCE LETTER (OUTPATIENT)
Age: 68
End: 2023-10-29

## 2024-08-08 NOTE — PROGRESS NOTES
"Jeniffer is a 69 year old  female who presents for annual exam.     Besides routine health maintenance, she has no other health concerns today .    HPI:  Here today for limited GYN exam --seeing Dr. Mosqueda later this week for yearly exam.  Postmenopausal.  No vb/spotting.  Denies hot flushes or night sweats.  Not SA.  Still having intermittent sensations of vaginal fullness but not interfering with day to day activities.  No bowel/bladder issues.  No leaking or incontinence issues.  No nighttime concerns --up usually once at 4AM    ; retired; 1 grown daughter;  has dementia but still doing fairly well  -staying active; anxious to get back to the gym and pickelball after right hip replacement in early July  +mammo already today; +SBE --right implant encapsulated but not problematic for Jeniffer  PCP -Dr. Mosqueda; follows meds, labs, etc  -hx osteoporosis --had been on fosomax until last bone scan in  with significant improvement in all areas  -up to date on colonoscopy      GYNECOLOGIC HISTORY:    No LMP recorded. Patient is postmenopausal.      Her current contraception method is: menopause and not sexually active.  She  reports that she has quit smoking. Her smoking use included cigarettes. She has never used smokeless tobacco.    Patient is not sexually active.  STD testing offered?  Declined  Last PHQ-9 score on record =       2024    10:13 AM   PHQ-9 SCORE   PHQ-9 Total Score 1     Last GAD7 score on record =       2024    10:13 AM   MENA-7 SCORE   Total Score 5     Alcohol Score = 3    HEALTH MAINTENANCE:  Cholesterol: (No results found for: \"CHOL\"   Last Mammo: One year ago, Result: Normal, Next Mammo: Today   Pap:   Lab Results   Component Value Date    PAP NIL 04/10/2018    4/10/2018 WNL HPV(-)neg  Colonoscopy:  , Result: Normal, Next Colonoscopy: 3 more years.  Dexa:  2022  FINDINGS:               Lumbar Spine (L1-L4)      T-score:  0.4               Left Femoral Neck    "         T-score:  -1.5               Right Femoral Neck          T-score:  -1.5                           Lumbar (L1-L4) BMD: 1.242  Previous: 1.081                          Total Hip Mean BMD: 0.810  Previous: 0.748    Health maintenance updated:  yes    HISTORY:  OB History    Para Term  AB Living   1 1 1 0 0 1   SAB IAB Ectopic Multiple Live Births   0 0 0 0 1      # Outcome Date GA Lbr Van/2nd Weight Sex Type Anes PTL Lv   1 Term 89   2.325 kg (5 lb 2 oz) F  EPI  IVAN      Name: Milvia       Patient Active Problem List   Diagnosis    Osteoporosis    Hypertension, essential    Family history of nonmelanoma skin cancer    History of actinic keratoses    Colon polyp    Adenomatous polyps    H/O colonoscopy     Past Surgical History:   Procedure Laterality Date    AS REVISE TOTAL HIP REPLACEMENT Right 2024    ENDOMETRIAL SAMPLING (BIOPSY)  2006    PMB-->path: benign    FOOT SURGERY      HAND SURGERY      thumb    HC ABLATION, ENDOMETRIAL, THERMAL, W/O HYSTEROSCOPIC GUIDANCE  2004    ([facility])    MAMMOPLASTY AUGMENTATION Bilateral     saline implants      Social History     Tobacco Use    Smoking status: Former     Types: Cigarettes    Smokeless tobacco: Never   Substance Use Topics    Alcohol use: Yes     Alcohol/week: 0.0 standard drinks of alcohol      Problem (# of Occurrences) Relation (Name,Age of Onset)    Parkinsonism (2) Brother, Brother    Diabetes (1) Mother    Heart Disease (1) Brother    Hypertension (2) Mother, Father    Osteoporosis (3) Mother, Maternal Grandmother, Maternal Grandfather    Breast Cancer (2) Sister (52), Paternal Grandmother    Prostate Cancer (1) Brother    Hyperlipidemia (2) Mother, Father    Other - See Comments (1) Other (): mild cognitive impairment    Colon Cancer (1) Father    Fractures (1) Mother: Hip    No Known Problems (2) Brother, Paternal Grandfather              Current Outpatient Medications   Medication Sig Dispense Refill  "   amLODIPine (NORVASC) 2.5 MG tablet Take 2.5 mg by mouth      Ascorbic Acid (VITAMIN C PO)       atorvastatin (LIPITOR) 10 MG tablet Take 10 mg by mouth      desonide (DESOWEN) 0.05 % external cream APPLY SPARINGLY TO AFFECTED AREA TWO TIMES PER DAY FOR NO MORE THEN 3 -5 CONSECUTIVE DAYS 15 g 1    lisinopril (PRINIVIL/ZESTRIL) 5 MG tablet Take 10 mg by mouth 2 times daily       LORazepam (ATIVAN) 1 MG tablet Take 1 mg by mouth      MAGNESIUM OXIDE PO       Multiple Vitamins-Minerals (MULTIVITAMIN PO)       rivaroxaban ANTICOAGULANT (XARELTO) 10 MG TABS tablet Take by mouth daily (with dinner)      Vitamin D3 (CHOLECALCIFEROL) 25 mcg (1000 units) tablet Take by mouth daily      Wheat Dextrin (BENEFIBER PO)        No current facility-administered medications for this visit.     Allergies   Allergen Reactions    Codeine Nausea    Oxycodone-Acetaminophen      PN: LW Reaction: GI Upset       Past medical, surgical, social and family histories were reviewed and updated in EPIC.    ROS:   12 point review of systems negative other than symptoms noted below or in the HPI.  No urinary frequency or dysuria, bladder or kidney problems    EXAM:  /78   Ht 1.676 m (5' 6\")   Wt 59.4 kg (131 lb)   BMI 21.14 kg/m     BMI: Body mass index is 21.14 kg/m .    PHYSICAL EXAM:  Constitutional:   Appearance: Well nourished, well developed, alert, in no acute distress  Neck:  Lymph Nodes:  No lymphadenopathy present    Thyroid:  Gland size normal, nontender, no nodules or masses present  on palpation  Chest:  Respiratory Effort:  Breathing unlabored  Cardiovascular:    Heart: Auscultation:  Regular rate, normal rhythm, no murmurs present  Breasts: Palpation of Breasts and Axillae:  No masses present on palpation, no breast tenderness., Axillary Lymph Nodes:  No lymphadenopathy present., No nodularity, asymmetry or nipple discharge bilaterally., and +IMPLANTS; +FIRMNESS R>L  Gastrointestinal:   Abdominal Examination:  Abdomen " nontender to palpation, tone normal without rigidity or guarding, no masses present, umbilicus without lesions   Liver and Spleen:  No hepatomegaly present, liver nontender to palpation    Hernias:  No hernias present  Lymphatic: Lymph Nodes:  No other lymphadenopathy present  Skin:  General Inspection:  No rashes present, no lesions present, no areas of  discoloration  Neurologic:    Mental Status:  Oriented X3.  Normal strength and tone, sensory exam                grossly normal, mentation intact and speech normal.    Psychiatric:   Mentation appears normal and affect normal/bright.         Pelvic Exam:  External Genitalia:     Normal appearance for age, no discharge present, no tenderness present, no inflammatory lesions present, color normal  Vagina:     Normal vaginal vault without central or paravaginal defects, no discharge present, no inflammatory lesions present, no masses present  Bladder:     Nontender to palpation  Urethra:   Urethral Body:  Urethra palpation normal, urethra structural support normal   Urethral Meatus:  No erythema or lesions present  Cervix:     Appearance healthy, no lesions present, nontender to palpation, no bleeding present  Uterus:     Uterus: firm, normal sized and nontender, midplane in position.   Adnexa:     No adnexal tenderness present, no adnexal masses present  Perineum:     Perineum within normal limits, no evidence of trauma, no rashes or skin lesions present  Anus:     Anus within normal limits, no hemorrhoids present  Inguinal Lymph Nodes:     No lymphadenopathy present  Pubic Hair:     Normal pubic hair distribution for age  Genitalia and Groin:     No rashes present, no lesions present, no areas of discoloration, no masses present    COUNSELING:   Reviewed preventive health counseling, as reflected in patient instructions  Special attention given to:        Regular exercise       Healthy diet/nutrition       Osteoporosis prevention/bone health       (Jyoti)menopause  management    BMI: Body mass index is 21.14 kg/m .      ASSESSMENT:  69 year old female with satisfactory annual exam.    ICD-10-CM    1. Encounter for gynecological examination without abnormal finding  Z01.419       2. Osteopenia of multiple sites  M85.89           PLAN:  Patient Instructions   Follow up with your primary care provider for your other medical problems.  Continue self breast exam.  Increase physical activity and exercise.  Usual safety and preventative measures counseling done.  Last pap smear (2018) was normal.  No pap was obtained this year due to patient age per guidelines.  This was discussed with the patient and she agrees with the plan.  Discussed Osteoporosis screening as well as calcium and Vitamin D recommendations.  Will repeat bone scan later this month as scheduled.         Terra Fraser MD

## 2024-08-13 ENCOUNTER — OFFICE VISIT (OUTPATIENT)
Dept: OBGYN | Facility: CLINIC | Age: 69
End: 2024-08-13
Payer: COMMERCIAL

## 2024-08-13 ENCOUNTER — ANCILLARY PROCEDURE (OUTPATIENT)
Dept: MAMMOGRAPHY | Facility: CLINIC | Age: 69
End: 2024-08-13
Payer: COMMERCIAL

## 2024-08-13 VITALS
WEIGHT: 131 LBS | DIASTOLIC BLOOD PRESSURE: 78 MMHG | BODY MASS INDEX: 21.05 KG/M2 | SYSTOLIC BLOOD PRESSURE: 132 MMHG | HEIGHT: 66 IN

## 2024-08-13 DIAGNOSIS — Z12.31 VISIT FOR SCREENING MAMMOGRAM: ICD-10-CM

## 2024-08-13 DIAGNOSIS — M85.89 OSTEOPENIA OF MULTIPLE SITES: ICD-10-CM

## 2024-08-13 DIAGNOSIS — Z01.419 ENCOUNTER FOR GYNECOLOGICAL EXAMINATION WITHOUT ABNORMAL FINDING: Primary | ICD-10-CM

## 2024-08-13 PROCEDURE — 99214 OFFICE O/P EST MOD 30 MIN: CPT | Performed by: OBSTETRICS & GYNECOLOGY

## 2024-08-13 PROCEDURE — 77067 SCR MAMMO BI INCL CAD: CPT | Mod: TC | Performed by: STUDENT IN AN ORGANIZED HEALTH CARE EDUCATION/TRAINING PROGRAM

## 2024-08-13 PROCEDURE — 77063 BREAST TOMOSYNTHESIS BI: CPT | Mod: TC | Performed by: STUDENT IN AN ORGANIZED HEALTH CARE EDUCATION/TRAINING PROGRAM

## 2024-08-13 ASSESSMENT — ANXIETY QUESTIONNAIRES
7. FEELING AFRAID AS IF SOMETHING AWFUL MIGHT HAPPEN: NOT AT ALL
2. NOT BEING ABLE TO STOP OR CONTROL WORRYING: SEVERAL DAYS
GAD7 TOTAL SCORE: 5
3. WORRYING TOO MUCH ABOUT DIFFERENT THINGS: SEVERAL DAYS
6. BECOMING EASILY ANNOYED OR IRRITABLE: SEVERAL DAYS
5. BEING SO RESTLESS THAT IT IS HARD TO SIT STILL: NOT AT ALL
GAD7 TOTAL SCORE: 5
IF YOU CHECKED OFF ANY PROBLEMS ON THIS QUESTIONNAIRE, HOW DIFFICULT HAVE THESE PROBLEMS MADE IT FOR YOU TO DO YOUR WORK, TAKE CARE OF THINGS AT HOME, OR GET ALONG WITH OTHER PEOPLE: SOMEWHAT DIFFICULT
1. FEELING NERVOUS, ANXIOUS, OR ON EDGE: SEVERAL DAYS

## 2024-08-13 ASSESSMENT — PATIENT HEALTH QUESTIONNAIRE - PHQ9
5. POOR APPETITE OR OVEREATING: SEVERAL DAYS
SUM OF ALL RESPONSES TO PHQ QUESTIONS 1-9: 1

## 2024-08-13 NOTE — PATIENT INSTRUCTIONS
Follow up with your primary care provider for your other medical problems.  Continue self breast exam.  Increase physical activity and exercise.  Usual safety and preventative measures counseling done.  Last pap smear (2018) was normal.  No pap was obtained this year due to patient age per guidelines.  This was discussed with the patient and she agrees with the plan.  Discussed Osteoporosis screening as well as calcium and Vitamin D recommendations.  Will repeat bone scan later this month as scheduled.

## 2024-08-21 ENCOUNTER — ANCILLARY PROCEDURE (OUTPATIENT)
Dept: BONE DENSITY | Facility: CLINIC | Age: 69
End: 2024-08-21
Attending: OBSTETRICS & GYNECOLOGY
Payer: COMMERCIAL

## 2024-08-21 DIAGNOSIS — Z78.0 ASYMPTOMATIC POSTMENOPAUSAL STATE: ICD-10-CM

## 2024-08-21 PROCEDURE — 77080 DXA BONE DENSITY AXIAL: CPT | Mod: TC | Performed by: PHYSICIAN ASSISTANT

## 2024-08-21 NOTE — LETTER
[facility] [address] [address]  Phone: [phone]  Fax: [phone]      Jeniffer Meyer     Date:  8/21/2024   [address] [place] [address]      Thank you for having your DEXA scan performed.  As you recall, the DEXA scan evaluates the strength of your bones.  It is important to know the strength of your bones to help you avoid breaking bones in the future.    A T score shows your risk for breaking a bone.  Normal bone has a T score of -0.9 or higher.  Some bone thinning (osteopenia) has a T score between -1.0 and -2.4.  A lot of bone thinning (osteoporosis) has a T score of -2.5 or lower.    Your T scores on 8/21/2024 were:     Left Hip:  -1.4 score    Right Hip:  NA    This T score shows you have some thinning (Osteopenia) Compared with your previous scan, this shows no major change.    Spine:  0.0 score      This T score shows you have normal bone Compared with your previous scan, this shows no major change.      The good news is that there are treatments for making your bones stronger, if you need them.    Ways you can make your bones stronger:    Weight bearing exercises such as walking.  Eat three servings of dairy a day, or take calcium each day (0806-4533 mg).  Take Vitamin D each day (1,000-2,000 IU).    For more information, please make an appointment with your Primary Care Provider, request a brochure from our office, or look on our website and go to the Bone Density page.     No need to make an appointment.    Jeniffer, continue Vitamin D, Calcium and exercise.    Overall your bones are very stable since stopping your fosamax!  Great news.  We'll continue with your fosamax holiday and plan to repeat your bone scan in 2 years.  Keep up the good work!    Thank you.    Terra Fraser MD

## 2025-08-19 ENCOUNTER — ANCILLARY PROCEDURE (OUTPATIENT)
Dept: MAMMOGRAPHY | Facility: CLINIC | Age: 70
End: 2025-08-19
Payer: COMMERCIAL

## 2025-08-19 ENCOUNTER — OFFICE VISIT (OUTPATIENT)
Dept: OBGYN | Facility: CLINIC | Age: 70
End: 2025-08-19
Payer: COMMERCIAL

## 2025-08-19 VITALS
WEIGHT: 134 LBS | SYSTOLIC BLOOD PRESSURE: 134 MMHG | DIASTOLIC BLOOD PRESSURE: 88 MMHG | HEIGHT: 66 IN | BODY MASS INDEX: 21.53 KG/M2

## 2025-08-19 DIAGNOSIS — N81.11 CYSTOCELE, MIDLINE: ICD-10-CM

## 2025-08-19 DIAGNOSIS — Z01.419 ENCOUNTER FOR GYNECOLOGICAL EXAMINATION WITHOUT ABNORMAL FINDING: Primary | ICD-10-CM

## 2025-08-19 DIAGNOSIS — Z12.31 VISIT FOR SCREENING MAMMOGRAM: ICD-10-CM

## 2025-08-19 DIAGNOSIS — M85.89 OSTEOPENIA OF MULTIPLE SITES: ICD-10-CM

## 2025-08-19 PROCEDURE — 3075F SYST BP GE 130 - 139MM HG: CPT | Performed by: OBSTETRICS & GYNECOLOGY

## 2025-08-19 PROCEDURE — 3079F DIAST BP 80-89 MM HG: CPT | Performed by: OBSTETRICS & GYNECOLOGY

## 2025-08-19 PROCEDURE — 99214 OFFICE O/P EST MOD 30 MIN: CPT | Performed by: OBSTETRICS & GYNECOLOGY

## 2025-08-19 PROCEDURE — 77063 BREAST TOMOSYNTHESIS BI: CPT | Mod: TC | Performed by: RADIOLOGY

## 2025-08-19 PROCEDURE — 77067 SCR MAMMO BI INCL CAD: CPT | Mod: TC | Performed by: RADIOLOGY

## 2025-08-19 ASSESSMENT — ANXIETY QUESTIONNAIRES
IF YOU CHECKED OFF ANY PROBLEMS ON THIS QUESTIONNAIRE, HOW DIFFICULT HAVE THESE PROBLEMS MADE IT FOR YOU TO DO YOUR WORK, TAKE CARE OF THINGS AT HOME, OR GET ALONG WITH OTHER PEOPLE: NOT DIFFICULT AT ALL
1. FEELING NERVOUS, ANXIOUS, OR ON EDGE: SEVERAL DAYS
3. WORRYING TOO MUCH ABOUT DIFFERENT THINGS: SEVERAL DAYS
GAD7 TOTAL SCORE: 4
6. BECOMING EASILY ANNOYED OR IRRITABLE: NOT AT ALL
7. FEELING AFRAID AS IF SOMETHING AWFUL MIGHT HAPPEN: SEVERAL DAYS
5. BEING SO RESTLESS THAT IT IS HARD TO SIT STILL: NOT AT ALL
GAD7 TOTAL SCORE: 4
2. NOT BEING ABLE TO STOP OR CONTROL WORRYING: SEVERAL DAYS

## 2025-08-19 ASSESSMENT — PATIENT HEALTH QUESTIONNAIRE - PHQ9
5. POOR APPETITE OR OVEREATING: NOT AT ALL
SUM OF ALL RESPONSES TO PHQ QUESTIONS 1-9: 1